# Patient Record
Sex: FEMALE | Race: WHITE | NOT HISPANIC OR LATINO | Employment: UNEMPLOYED | URBAN - METROPOLITAN AREA
[De-identification: names, ages, dates, MRNs, and addresses within clinical notes are randomized per-mention and may not be internally consistent; named-entity substitution may affect disease eponyms.]

---
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
## 2022-06-25 ENCOUNTER — HOSPITAL ENCOUNTER (EMERGENCY)
Facility: HOSPITAL | Age: 22
Discharge: HOME | End: 2022-06-26
Attending: EMERGENCY MEDICINE | Admitting: EMERGENCY MEDICINE

## 2022-06-25 DIAGNOSIS — N39.0 ACUTE UTI: Primary | ICD-10-CM

## 2022-06-25 LAB
B-HCG UR QL: NEGATIVE
BILIRUB UR QL STRIP: ABNORMAL
CLARITY UR: ABNORMAL
COLOR UR: YELLOW
GLUCOSE UR STRIP-MCNC: NEGATIVE MG/DL
HGB UR QL STRIP.AUTO: ABNORMAL
KETONES UR QL STRIP: ABNORMAL
LEUKOCYTE ESTERASE UR QL STRIP.AUTO: ABNORMAL
NITRITE UR QL STRIP: POSITIVE
PH UR STRIP.AUTO: 6 [PH] (ref 5–8)
PROT UR QL STRIP: ABNORMAL
SP GR UR STRIP: 1.03 (ref 1–1.03)
UROBILINOGEN UR QL STRIP: ABNORMAL

## 2022-06-25 PROCEDURE — 87186 SC STD MICRODIL/AGAR DIL: CPT | Performed by: EMERGENCY MEDICINE

## 2022-06-25 PROCEDURE — 87086 URINE CULTURE/COLONY COUNT: CPT | Performed by: EMERGENCY MEDICINE

## 2022-06-25 PROCEDURE — 81001 URINALYSIS AUTO W/SCOPE: CPT | Performed by: EMERGENCY MEDICINE

## 2022-06-25 PROCEDURE — 87088 URINE BACTERIA CULTURE: CPT | Performed by: EMERGENCY MEDICINE

## 2022-06-25 PROCEDURE — 81025 URINE PREGNANCY TEST: CPT | Performed by: EMERGENCY MEDICINE

## 2022-06-25 PROCEDURE — 99283 EMERGENCY DEPT VISIT LOW MDM: CPT

## 2022-06-26 VITALS
OXYGEN SATURATION: 98 % | WEIGHT: 163.14 LBS | HEIGHT: 62 IN | BODY MASS INDEX: 30.02 KG/M2 | HEART RATE: 89 BPM | TEMPERATURE: 98.2 F | RESPIRATION RATE: 17 BRPM | SYSTOLIC BLOOD PRESSURE: 104 MMHG | DIASTOLIC BLOOD PRESSURE: 69 MMHG

## 2022-06-26 LAB
BACTERIA UR QL AUTO: ABNORMAL /HPF
HYALINE CASTS UR QL AUTO: ABNORMAL /LPF
MUCOUS THREADS URNS QL MICRO: ABNORMAL /HPF
RBC # UR STRIP: ABNORMAL /HPF
REF LAB TEST METHOD: ABNORMAL
SQUAMOUS #/AREA URNS HPF: ABNORMAL /HPF
WBC # UR STRIP: ABNORMAL /HPF

## 2022-06-26 RX ORDER — PHENAZOPYRIDINE HYDROCHLORIDE 200 MG/1
200 TABLET, FILM COATED ORAL 3 TIMES DAILY PRN
Qty: 6 TABLET | Refills: 0 | Status: SHIPPED | OUTPATIENT
Start: 2022-06-26

## 2022-06-26 RX ORDER — SULFAMETHOXAZOLE AND TRIMETHOPRIM 800; 160 MG/1; MG/1
1 TABLET ORAL ONCE
Status: COMPLETED | OUTPATIENT
Start: 2022-06-26 | End: 2022-06-26

## 2022-06-26 RX ORDER — PHENAZOPYRIDINE HYDROCHLORIDE 200 MG/1
200 TABLET, FILM COATED ORAL ONCE
Status: COMPLETED | OUTPATIENT
Start: 2022-06-26 | End: 2022-06-26

## 2022-06-26 RX ORDER — SULFAMETHOXAZOLE AND TRIMETHOPRIM 800; 160 MG/1; MG/1
1 TABLET ORAL 2 TIMES DAILY
Qty: 10 TABLET | Refills: 0 | Status: SHIPPED | OUTPATIENT
Start: 2022-06-26

## 2022-06-26 RX ORDER — IBUPROFEN 600 MG/1
600 TABLET ORAL ONCE
Status: COMPLETED | OUTPATIENT
Start: 2022-06-26 | End: 2022-06-26

## 2022-06-26 RX ADMIN — IBUPROFEN 600 MG: 600 TABLET, FILM COATED ORAL at 01:14

## 2022-06-26 RX ADMIN — PHENAZOPYRIDINE HYDROCHLORIDE 200 MG: 200 TABLET ORAL at 01:13

## 2022-06-26 RX ADMIN — SULFAMETHOXAZOLE AND TRIMETHOPRIM 1 TABLET: 800; 160 TABLET ORAL at 01:14

## 2022-06-26 NOTE — ED PROVIDER NOTES
Subjective   22-year-old female with hematuria and some dysuria for the past 24 hours.  Patient denies any vaginal symptoms, normal last menstrual period 2 weeks ago.  Patient denies any fever or flank pain.  Patient has some mild lower abdominal pain associate with this without any vomiting or diarrhea or appetite change.          Review of Systems   Constitutional: Negative.    Gastrointestinal: Positive for abdominal pain.   Genitourinary: Positive for hematuria.   Musculoskeletal: Negative.    Skin: Negative.    Neurological: Negative.    Psychiatric/Behavioral: Negative.        Past Medical History:   Diagnosis Date   • Acid reflux    • Allergic    • Anxiety    • Bipolar disorder (CMS/HCC)    • Depression    • Sinusitis    • Strep throat        Allergies   Allergen Reactions   • Peanut (Diagnostic) Hives, Shortness Of Breath and Swelling       Past Surgical History:   Procedure Laterality Date   • ADENOIDECTOMY     • TONSILLECTOMY     • WISDOM TOOTH EXTRACTION         Family History   Adopted: Yes   Problem Relation Age of Onset   • ADD / ADHD Mother    • Depression Mother    • Anxiety disorder Mother    • Bipolar disorder Mother    • Arthritis Mother    • Anemia Mother    • ADD / ADHD Father    • Depression Father    • Anxiety disorder Father    • Anemia Sister    • Heart murmur Sister    • Obesity Paternal Uncle        Social History     Socioeconomic History   • Marital status: Single   Tobacco Use   • Smoking status: Passive Smoke Exposure - Never Smoker   • Smokeless tobacco: Former User   • Tobacco comment: Parents smoked in home for 13 years; pt vapes   Substance and Sexual Activity   • Alcohol use: No   • Drug use: Defer   • Sexual activity: Defer           Objective   Physical Exam  Constitutional:       Appearance: Normal appearance.   HENT:      Head: Normocephalic and atraumatic.      Mouth/Throat:      Mouth: Mucous membranes are moist.      Pharynx: Oropharynx is clear.   Eyes:       Conjunctiva/sclera: Conjunctivae normal.      Pupils: Pupils are equal, round, and reactive to light.   Cardiovascular:      Rate and Rhythm: Normal rate and regular rhythm.      Heart sounds: Normal heart sounds.   Pulmonary:      Effort: Pulmonary effort is normal.      Breath sounds: Normal breath sounds.   Abdominal:      General: Bowel sounds are normal. There is no distension.      Palpations: Abdomen is soft.      Comments: Mild lower abdominal tenderness, no McBurney's point tenderness   Musculoskeletal:         General: No swelling or tenderness. Normal range of motion.   Skin:     General: Skin is warm and dry.      Capillary Refill: Capillary refill takes less than 2 seconds.   Neurological:      General: No focal deficit present.      Mental Status: She is alert and oriented to person, place, and time.   Psychiatric:         Mood and Affect: Mood normal.         Behavior: Behavior normal.         Procedures           ED Course                                           MDM  Number of Diagnoses or Management Options  Acute UTI  Diagnosis management comments: Results for orders placed or performed during the hospital encounter of 06/25/22  -Urinalysis With Culture If Indicated - Urine, Clean Catch:   Specimen: Urine, Clean Catch       Result                      Value             Ref Range           Color, UA                   Yellow            Yellow, Straw       Appearance, UA              Turbid (A)        Clear               pH, UA                      6.0               5.0 - 8.0           Specific [place], UA        1.029             1.005 - 1.030       Glucose, UA                 Negative          Negative            Ketones, UA                                   Negative        15 mg/dL (1+) (A)       Bilirubin, UA                                 Negative        Small (1+) (A)       Blood, UA                                     Negative        Large (3+) (A)       Protein, UA                                    Negative        >=300 mg/dL (3+) (A)       Leuk Esterase, UA                             Negative        Large (3+) (A)       Nitrite, UA                 Positive (A)      Negative            Urobilinogen, UA            1.0 E.U./dL       0.2 - 1.0 E.*  -Pregnancy, Urine - Urine, Clean Catch:   Specimen: Urine, Clean Catch       Result                      Value             Ref Range           HCG, Urine QL               Negative          Negative       -Urinalysis, Microscopic Only - Urine, Clean Catch:   Specimen: Urine, Clean Catch       Result                      Value             Ref Range           RBC, UA                     21-30 (A)         None Seen /H*       WBC, UA                                       None Seen /H*   Too Numerous to Count (A)       Bacteria, UA                3+ (A)            None Seen /H*       Squamous Epithelial Ce*     0-2               None Seen, 0*       Hyaline Casts, UA           0-2               None Seen /L*       Mucus, UA                   Small/1+ (A)      None Seen, T*       Methodology                                                   Manual Light Microscopy    Patient appears well, return precautions reviewed, no recent antibiotic usage.       Amount and/or Complexity of Data Reviewed  Clinical lab tests: ordered and reviewed        Final diagnoses:   Acute UTI       ED Disposition  ED Disposition     ED Disposition   Discharge    Condition   Stable    Comment   --             Nupur Willis, APRN  [address] [place] [address]  [phone]               Medication List      New Prescriptions    phenazopyridine 200 MG tablet  Commonly known as: PYRIDIUM  Take 1 tablet by mouth 3 (Three) Times a Day As Needed for Bladder Spasms.     sulfamethoxazole-trimethoprim 800-160 MG per tablet  Commonly known as: BACTRIM DS,SEPTRA DS  Take 1 tablet by mouth 2 (Two) Times a Day.           Where to Get Your Medications      These medications were sent  to [id number] - [place], [place] - [address] [place] [facility] [place] [address] [address] [place] - [phone]  - [phone] FX  [address] [place] [address]    Phone: [phone]   · phenazopyridine 200 MG tablet  · sulfamethoxazole-trimethoprim 800-160 MG per tablet          Saeid Wooten MD  06/26/22 0041

## 2022-06-27 LAB — BACTERIA SPEC AEROBE CULT: ABNORMAL

## 2022-06-27 NOTE — PHARMACY RECOMMENDATION
Patient urine culture resulted with E. coli. Susceptible to Sulfonamides. Patient was given Rx for sulfamethoxazole/ trimethoprim. Therapy is appropriate coverage. No further follow-up required..    Microbiology Results (last 10 days)       Procedure Component Value - Date/Time    Urine Culture - Urine, Urine, Clean Catch [[id number]]  (Abnormal)  (Susceptibility) Collected: 06/25/22 3786    Lab Status: Final result Specimen: Urine, Clean Catch Updated: 06/27/22 1042     Urine Culture >100,000 CFU/mL Escherichia coli    Narrative:      Colonization of the urinary tract without infection is common. Treatment is discouraged unless the patient is symptomatic, pregnant, or undergoing an invasive urologic procedure.    Susceptibility        Escherichia coli      RONNY      Ampicillin Susceptible      Ampicillin + Sulbactam Susceptible      Cefazolin Susceptible      Cefepime Susceptible      Ceftazidime Susceptible      Ceftriaxone Susceptible      Gentamicin Susceptible      Levofloxacin Susceptible      Nitrofurantoin Susceptible      Piperacillin + Tazobactam Susceptible      Trimethoprim + Sulfamethoxazole Susceptible                                   Amy Soliman RPH  6/27/2022 11:27 EDT

## 2022-09-19 ENCOUNTER — HOSPITAL ENCOUNTER (EMERGENCY)
Facility: HOSPITAL | Age: 22
Discharge: LEFT BEFORE DISCHARGE | End: 2022-09-19
Attending: EMERGENCY MEDICINE | Admitting: EMERGENCY MEDICINE

## 2022-09-19 VITALS
SYSTOLIC BLOOD PRESSURE: 105 MMHG | DIASTOLIC BLOOD PRESSURE: 69 MMHG | OXYGEN SATURATION: 96 % | HEIGHT: 62 IN | WEIGHT: 132.28 LBS | RESPIRATION RATE: 16 BRPM | BODY MASS INDEX: 24.34 KG/M2 | HEART RATE: 70 BPM | TEMPERATURE: 98.2 F

## 2022-09-19 PROCEDURE — 99211 OFF/OP EST MAY X REQ PHY/QHP: CPT | Performed by: EMERGENCY MEDICINE

## 2025-02-07 ENCOUNTER — HOSPITAL ENCOUNTER (EMERGENCY)
Facility: HOSPITAL | Age: 25
Discharge: HOME | End: 2025-02-07
Attending: EMERGENCY MEDICINE
Payer: MEDICAID

## 2025-02-07 ENCOUNTER — APPOINTMENT (OUTPATIENT)
Dept: ULTRASOUND IMAGING | Facility: HOSPITAL | Age: 25
End: 2025-02-07
Payer: MEDICAID

## 2025-02-07 VITALS
RESPIRATION RATE: 14 BRPM | DIASTOLIC BLOOD PRESSURE: 58 MMHG | TEMPERATURE: 98.1 F | HEIGHT: 62 IN | BODY MASS INDEX: 24.79 KG/M2 | HEART RATE: 78 BPM | OXYGEN SATURATION: 100 % | WEIGHT: 134.7 LBS | SYSTOLIC BLOOD PRESSURE: 98 MMHG

## 2025-02-07 DIAGNOSIS — Z3A.17 17 WEEKS GESTATION OF PREGNANCY: ICD-10-CM

## 2025-02-07 DIAGNOSIS — E86.0 DEHYDRATION: ICD-10-CM

## 2025-02-07 DIAGNOSIS — N39.0 URINARY TRACT INFECTION WITHOUT HEMATURIA, SITE UNSPECIFIED: ICD-10-CM

## 2025-02-07 DIAGNOSIS — R11.2 NAUSEA AND VOMITING, UNSPECIFIED VOMITING TYPE: Primary | ICD-10-CM

## 2025-02-07 LAB
ALBUMIN SERPL-MCNC: 3.5 G/DL (ref 3.5–5.2)
ALBUMIN/GLOB SERPL: 1.4 G/DL
ALP SERPL-CCNC: 44 U/L (ref 39–117)
ALT SERPL W P-5'-P-CCNC: 7 U/L (ref 1–33)
ANION GAP SERPL CALCULATED.3IONS-SCNC: 7.6 MMOL/L (ref 5–15)
AST SERPL-CCNC: 13 U/L (ref 1–32)
ATMOSPHERIC PRESS: ABNORMAL MM[HG]
BACTERIA UR QL AUTO: ABNORMAL /HPF
BASE EXCESS BLDV CALC-SCNC: -1.1 MMOL/L (ref -2–2)
BASOPHILS # BLD AUTO: 0.02 10*3/MM3 (ref 0–0.2)
BASOPHILS NFR BLD AUTO: 0.2 % (ref 0–1.5)
BDY SITE: ABNORMAL
BILIRUB SERPL-MCNC: 0.2 MG/DL (ref 0–1.2)
BILIRUB UR QL STRIP: NEGATIVE
BUN SERPL-MCNC: 5 MG/DL (ref 6–20)
BUN/CREAT SERPL: 8.9 (ref 7–25)
CALCIUM SPEC-SCNC: 8.8 MG/DL (ref 8.6–10.5)
CHLORIDE SERPL-SCNC: 105 MMOL/L (ref 98–107)
CLARITY UR: ABNORMAL
CO2 BLDA-SCNC: 26.8 MMOL/L (ref 22–29)
CO2 SERPL-SCNC: 23.4 MMOL/L (ref 22–29)
COLOR UR: YELLOW
CREAT SERPL-MCNC: 0.56 MG/DL (ref 0.57–1)
D-LACTATE SERPL-SCNC: 1 MMOL/L (ref 0.3–2)
DEPRECATED RDW RBC AUTO: 48.9 FL (ref 37–54)
EGFRCR SERPLBLD CKD-EPI 2021: 132.5 ML/MIN/1.73
EOSINOPHIL # BLD AUTO: 0.14 10*3/MM3 (ref 0–0.4)
EOSINOPHIL NFR BLD AUTO: 1.4 % (ref 0.3–6.2)
ERYTHROCYTE [DISTWIDTH] IN BLOOD BY AUTOMATED COUNT: 14.2 % (ref 12.3–15.4)
GLOBULIN UR ELPH-MCNC: 2.5 GM/DL
GLUCOSE SERPL-MCNC: 84 MG/DL (ref 65–99)
GLUCOSE UR STRIP-MCNC: NEGATIVE MG/DL
HCG INTACT+B SERPL-ACNC: NORMAL MIU/ML
HCO3 BLDV-SCNC: 25.3 MMOL/L (ref 22–26)
HCT VFR BLD AUTO: 31 % (ref 34–46.6)
HGB BLD-MCNC: 10.2 G/DL (ref 12–15.9)
HGB UR QL STRIP.AUTO: ABNORMAL
HYALINE CASTS UR QL AUTO: ABNORMAL /LPF
IMM GRANULOCYTES # BLD AUTO: 0.04 10*3/MM3 (ref 0–0.05)
IMM GRANULOCYTES NFR BLD AUTO: 0.4 % (ref 0–0.5)
INHALED O2 CONCENTRATION: 21 %
KETONES UR QL STRIP: ABNORMAL
LEUKOCYTE ESTERASE UR QL STRIP.AUTO: ABNORMAL
LYMPHOCYTES # BLD AUTO: 1.71 10*3/MM3 (ref 0.7–3.1)
LYMPHOCYTES NFR BLD AUTO: 17.1 % (ref 19.6–45.3)
MCH RBC QN AUTO: 30.8 PG (ref 26.6–33)
MCHC RBC AUTO-ENTMCNC: 32.9 G/DL (ref 31.5–35.7)
MCV RBC AUTO: 93.7 FL (ref 79–97)
MODALITY: ABNORMAL
MONOCYTES # BLD AUTO: 0.62 10*3/MM3 (ref 0.1–0.9)
MONOCYTES NFR BLD AUTO: 6.2 % (ref 5–12)
NEUTROPHILS NFR BLD AUTO: 7.48 10*3/MM3 (ref 1.7–7)
NEUTROPHILS NFR BLD AUTO: 74.7 % (ref 42.7–76)
NITRITE UR QL STRIP: NEGATIVE
NRBC BLD AUTO-RTO: 0 /100 WBC (ref 0–0.2)
PCO2 BLDV: 48.8 MM HG (ref 42–51)
PH BLDV: 7.32 PH UNITS (ref 7.32–7.43)
PH UR STRIP.AUTO: 6 [PH] (ref 5–8)
PLATELET # BLD AUTO: 206 10*3/MM3 (ref 140–450)
PMV BLD AUTO: 10.4 FL (ref 6–12)
PO2 BLDV: 19.8 MM HG (ref 40–42)
POTASSIUM SERPL-SCNC: 3.7 MMOL/L (ref 3.5–5.2)
PROT SERPL-MCNC: 6 G/DL (ref 6–8.5)
PROT UR QL STRIP: ABNORMAL
RBC # BLD AUTO: 3.31 10*6/MM3 (ref 3.77–5.28)
RBC # UR STRIP: ABNORMAL /HPF
REF LAB TEST METHOD: ABNORMAL
SAO2 % BLDCOV: 27.2 % (ref 45–75)
SODIUM SERPL-SCNC: 136 MMOL/L (ref 136–145)
SP GR UR STRIP: 1.02 (ref 1–1.03)
SQUAMOUS #/AREA URNS HPF: ABNORMAL /HPF
UROBILINOGEN UR QL STRIP: ABNORMAL
WBC # UR STRIP: ABNORMAL /HPF
WBC NRBC COR # BLD AUTO: 10.01 10*3/MM3 (ref 3.4–10.8)

## 2025-02-07 PROCEDURE — 25810000003 SODIUM CHLORIDE 0.9 % SOLUTION: Performed by: EMERGENCY MEDICINE

## 2025-02-07 PROCEDURE — 96365 THER/PROPH/DIAG IV INF INIT: CPT

## 2025-02-07 PROCEDURE — 83605 ASSAY OF LACTIC ACID: CPT | Performed by: EMERGENCY MEDICINE

## 2025-02-07 PROCEDURE — 84702 CHORIONIC GONADOTROPIN TEST: CPT | Performed by: EMERGENCY MEDICINE

## 2025-02-07 PROCEDURE — 82803 BLOOD GASES ANY COMBINATION: CPT | Performed by: EMERGENCY MEDICINE

## 2025-02-07 PROCEDURE — 87186 SC STD MICRODIL/AGAR DIL: CPT | Performed by: EMERGENCY MEDICINE

## 2025-02-07 PROCEDURE — 99284 EMERGENCY DEPT VISIT MOD MDM: CPT

## 2025-02-07 PROCEDURE — 25010000002 THIAMINE HCL 200 MG/2ML SOLUTION: Performed by: EMERGENCY MEDICINE

## 2025-02-07 PROCEDURE — 81001 URINALYSIS AUTO W/SCOPE: CPT | Performed by: EMERGENCY MEDICINE

## 2025-02-07 PROCEDURE — 87086 URINE CULTURE/COLONY COUNT: CPT | Performed by: EMERGENCY MEDICINE

## 2025-02-07 PROCEDURE — 80053 COMPREHEN METABOLIC PANEL: CPT | Performed by: EMERGENCY MEDICINE

## 2025-02-07 PROCEDURE — 87077 CULTURE AEROBIC IDENTIFY: CPT | Performed by: EMERGENCY MEDICINE

## 2025-02-07 PROCEDURE — 25810000003 LACTATED RINGERS SOLUTION: Performed by: EMERGENCY MEDICINE

## 2025-02-07 PROCEDURE — 76815 OB US LIMITED FETUS(S): CPT

## 2025-02-07 PROCEDURE — 87040 BLOOD CULTURE FOR BACTERIA: CPT | Performed by: EMERGENCY MEDICINE

## 2025-02-07 PROCEDURE — 36415 COLL VENOUS BLD VENIPUNCTURE: CPT

## 2025-02-07 PROCEDURE — 96375 TX/PRO/DX INJ NEW DRUG ADDON: CPT

## 2025-02-07 PROCEDURE — 25010000002 CEFTRIAXONE PER 250 MG: Performed by: EMERGENCY MEDICINE

## 2025-02-07 PROCEDURE — 85025 COMPLETE CBC W/AUTO DIFF WBC: CPT | Performed by: EMERGENCY MEDICINE

## 2025-02-07 RX ORDER — SODIUM CHLORIDE 0.9 % (FLUSH) 0.9 %
10 SYRINGE (ML) INJECTION AS NEEDED
Status: DISCONTINUED | OUTPATIENT
Start: 2025-02-07 | End: 2025-02-07 | Stop reason: HOSPADM

## 2025-02-07 RX ORDER — THIAMINE HYDROCHLORIDE 100 MG/ML
200 INJECTION, SOLUTION INTRAMUSCULAR; INTRAVENOUS ONCE
Status: COMPLETED | OUTPATIENT
Start: 2025-02-07 | End: 2025-02-07

## 2025-02-07 RX ADMIN — THIAMINE HYDROCHLORIDE 200 MG: 100 INJECTION, SOLUTION INTRAMUSCULAR; INTRAVENOUS at 16:38

## 2025-02-07 RX ADMIN — SODIUM CHLORIDE 1000 ML: 9 INJECTION, SOLUTION INTRAVENOUS at 16:39

## 2025-02-07 RX ADMIN — CEFTRIAXONE 2000 MG: 2 INJECTION, POWDER, FOR SOLUTION INTRAMUSCULAR; INTRAVENOUS at 18:28

## 2025-02-07 RX ADMIN — SODIUM CHLORIDE, POTASSIUM CHLORIDE, SODIUM LACTATE AND CALCIUM CHLORIDE 1000 ML: 600; 310; 30; 20 INJECTION, SOLUTION INTRAVENOUS at 18:22

## 2025-02-07 NOTE — ED NOTES
Pt rpeorted her bladder was full and ready for ultrasound. Attempted to call and left message for ultrasound 5 times.

## 2025-02-07 NOTE — ED PROVIDER NOTES
Subjective   History of Present Illness  24-year-old female states she was seen at urgent care center today for nausea and lightheadedness.  States she has had some vomiting on and off for the last couple of months.  States she had a positive home pregnancy test last month and her last menstruation was October.  She denies abdominal pain or vaginal bleeding or discharge.  She states she did have some vomiting today she reports no diarrhea or constipation.  States she has 1 previous pregnancy that miscarried after 1 week  She reports no urinary symptoms.  Review of Systems    Past Medical History:   Diagnosis Date    Acid reflux     Allergic     Anxiety     Bipolar disorder     Depression     Sinusitis     Strep throat        Allergies   Allergen Reactions    Peanut (Diagnostic) Hives, Shortness Of Breath and Swelling       Past Surgical History:   Procedure Laterality Date    ADENOIDECTOMY      TONSILLECTOMY      WISDOM TOOTH EXTRACTION         Family History   Adopted: Yes   Problem Relation Age of Onset    ADD / ADHD Mother     Depression Mother     Anxiety disorder Mother     Bipolar disorder Mother     Arthritis Mother     Anemia Mother     ADD / ADHD Father     Depression Father     Anxiety disorder Father     Anemia Sister     Heart murmur Sister     Obesity Paternal Uncle        Social History     Socioeconomic History    Marital status: Single   Tobacco Use    Smoking status: Former     Types: Electronic Cigarette     Start date: 8/13/2019     Passive exposure: Yes    Smokeless tobacco: Former    Tobacco comments:     Parents smoked in home for 13 years; pt vapes   Vaping Use    Vaping status: Every Day   Substance and Sexual Activity    Alcohol use: No    Drug use: Defer    Sexual activity: Defer     Prior to Admission medications    Medication Sig Start Date End Date Taking? Authorizing Provider   azithromycin (Zithromax Z-Ruddy) 250 MG tablet Take 2 tablets the first day, then 1 tablet daily for 4 days.  "10/14/20   Holly Velázquez PA-C   ondansetron ODT (Zofran ODT) 4 MG disintegrating tablet Place 1 tablet under the tongue Every 8 (Eight) Hours As Needed for Nausea. 1/27/21   Holly Graham PA   oxymetazoline (AFRIN) 0.05 % nasal spray 2 sprays into the nostril(s) as directed by provider 2 (Two) Times a Day. 10/14/20   Holly Velázquez PA-C   omeprazole (priLOSEC) 20 MG capsule PRN 6/4/19 2/7/25  Isabelle Orellana MD   phenazopyridine (PYRIDIUM) 200 MG tablet Take 1 tablet by mouth 3 (Three) Times a Day As Needed for Bladder Spasms. 6/26/22 2/7/25  Saeid Wooten MD   sulfamethoxazole-trimethoprim (BACTRIM DS,SEPTRA DS) 800-160 MG per tablet Take 1 tablet by mouth 2 (Two) Times a Day. 6/26/22 2/7/25  Saeid Wooten MD   TRI-LO-ESTARYLLA 0.18/0.215/0.25 MG-25 MCG per tablet Take 1 tablet by mouth Daily. 8/22/19 2/7/25  ProviderIsabelle MD     BP 97/59   Pulse 82   Temp 98.1 °F (36.7 °C)   Resp 14   Ht 157.5 cm (62\")   Wt 61.1 kg (134 lb 11.2 oz)   LMP  (LMP Unknown)   SpO2 93%   Breastfeeding Unknown   BMI 24.64 kg/m²         Objective   Physical Exam  General: Well-developed female awake and alert, no acute distress  Eyes: Pupils round and reactive, sclera nonicteric  HEENT: Mucous membranes very dry, no mucosal swelling  Neck: Supple, no nuchal rigidity, no JVD  Respirations: Respirations nonlabored, equal breath sounds bilaterally, clear lungs  Heart regular rate and rhythm, no murmurs rubs or gallops,   Abdomen soft nontender nondistended, no hepatosplenomegaly, no hernia, no mass, normal bowel sounds, no CVA tenderness  Extremities no clubbing cyanosis or edema, calves are symmetric and nontender  Neuro cranial nerves grossly intact, no focal limb deficits  Psych oriented, pleasant affect  Skin no rash, brisk cap refill  Procedures           ED Course    Results for orders placed or performed during the hospital encounter of 02/07/25   Comprehensive Metabolic Panel    Collection Time: " 02/07/25  4:14 PM    Specimen: Blood   Result Value Ref Range    Glucose 84 65 - 99 mg/dL    BUN 5 (L) 6 - 20 mg/dL    Creatinine 0.56 (L) 0.57 - 1.00 mg/dL    Sodium 136 136 - 145 mmol/L    Potassium 3.7 3.5 - 5.2 mmol/L    Chloride 105 98 - 107 mmol/L    CO2 23.4 22.0 - 29.0 mmol/L    Calcium 8.8 8.6 - 10.5 mg/dL    Total Protein 6.0 6.0 - 8.5 g/dL    Albumin 3.5 3.5 - 5.2 g/dL    ALT (SGPT) 7 1 - 33 U/L    AST (SGOT) 13 1 - 32 U/L    Alkaline Phosphatase 44 39 - 117 U/L    Total Bilirubin 0.2 0.0 - 1.2 mg/dL    Globulin 2.5 gm/dL    A/G Ratio 1.4 g/dL    BUN/Creatinine Ratio 8.9 7.0 - 25.0    Anion Gap 7.6 5.0 - 15.0 mmol/L    eGFR 132.5 >60.0 mL/min/1.73   hCG, Quantitative, Pregnancy    Collection Time: 02/07/25  4:14 PM    Specimen: Blood   Result Value Ref Range    HCG Quantitative 21,286.00 mIU/mL   Urinalysis With Microscopic If Indicated (No Culture) - Urine, Clean Catch    Collection Time: 02/07/25  4:14 PM    Specimen: Urine, Clean Catch   Result Value Ref Range    Color, UA Yellow Yellow, Straw    Appearance, UA Cloudy (A) Clear    pH, UA 6.0 5.0 - 8.0    Specific Gravity, UA 1.023 1.005 - 1.030    Glucose, UA Negative Negative    Ketones, UA Trace (A) Negative    Bilirubin, UA Negative Negative    Blood, UA Moderate (2+) (A) Negative    Protein,  mg/dL (2+) (A) Negative    Leuk Esterase, UA Moderate (2+) (A) Negative    Nitrite, UA Negative Negative    Urobilinogen, UA 1.0 E.U./dL 0.2 - 1.0 E.U./dL   CBC Auto Differential    Collection Time: 02/07/25  4:14 PM    Specimen: Blood   Result Value Ref Range    WBC 10.01 3.40 - 10.80 10*3/mm3    RBC 3.31 (L) 3.77 - 5.28 10*6/mm3    Hemoglobin 10.2 (L) 12.0 - 15.9 g/dL    Hematocrit 31.0 (L) 34.0 - 46.6 %    MCV 93.7 79.0 - 97.0 fL    MCH 30.8 26.6 - 33.0 pg    MCHC 32.9 31.5 - 35.7 g/dL    RDW 14.2 12.3 - 15.4 %    RDW-SD 48.9 37.0 - 54.0 fl    MPV 10.4 6.0 - 12.0 fL    Platelets 206 140 - 450 10*3/mm3    Neutrophil % 74.7 42.7 - 76.0 %    Lymphocyte %  17.1 (L) 19.6 - 45.3 %    Monocyte % 6.2 5.0 - 12.0 %    Eosinophil % 1.4 0.3 - 6.2 %    Basophil % 0.2 0.0 - 1.5 %    Immature Grans % 0.4 0.0 - 0.5 %    Neutrophils, Absolute 7.48 (H) 1.70 - 7.00 10*3/mm3    Lymphocytes, Absolute 1.71 0.70 - 3.10 10*3/mm3    Monocytes, Absolute 0.62 0.10 - 0.90 10*3/mm3    Eosinophils, Absolute 0.14 0.00 - 0.40 10*3/mm3    Basophils, Absolute 0.02 0.00 - 0.20 10*3/mm3    Immature Grans, Absolute 0.04 0.00 - 0.05 10*3/mm3    nRBC 0.0 0.0 - 0.2 /100 WBC   Urinalysis, Microscopic Only - Urine, Clean Catch    Collection Time: 02/07/25  4:14 PM    Specimen: Urine, Clean Catch   Result Value Ref Range    RBC, UA 11-20 (A) None Seen, 0-2 /HPF    WBC, UA Too Numerous to Count (A) None Seen, 0-2 /HPF    Bacteria, UA 2+ (A) None Seen /HPF    Squamous Epithelial Cells, UA 3-6 (A) None Seen, 0-2 /HPF    Hyaline Casts, UA 7-12 None Seen /LPF    Methodology Manual Light Microscopy    Blood Gas, Venous -    Collection Time: 02/07/25  4:29 PM    Specimen: Venous Blood   Result Value Ref Range    Site CentralLine     pH, Venous 7.322 7.320 - 7.430 pH Units    pCO2, Venous 48.8 42.0 - 51.0 mm Hg    pO2, Venous 19.8 (C) 40.0 - 42.0 mm Hg    HCO3, Venous 25.3 22.0 - 26.0 mmol/L    Base Excess, Venous -1.1 -2.0 - 2.0 mmol/L    O2 Saturation, Venous 27.2 (L) 45.0 - 75.0 %    CO2 Content 26.8 22 - 29 mmol/L    Barometric Pressure for Blood Gas      Modality Room Air     FIO2 21 %   POC Lactate    Collection Time: 02/07/25  6:29 PM    Specimen: Blood   Result Value Ref Range    Lactate 1.0 0.3 - 2.0 mmol/L     US Ob Limited 1 + Fetuses    Result Date: 2/7/2025  Small fetal colloid cysts of unknown clinical significance. Otherwise normal appearance of the uterus. Fetal heart rate is normal. Electronically Signed: Krishna Han MD  2/7/2025 7:35 PM EST  Workstation ID: [id number]     ED Course as of 02/07/25 2017 Fri Feb 07, 2025 1949 Patient states she feels much better.  She has been tolerating clear  liquids and had no vomiting since arrival.  She has a benign abdominal examination with no tenderness.  Patient is eager to go home. [SH]      ED Course User Index  [SH] Gunnar Hester MD                                                       Medical Decision Making  Differential diagnosis including dehydration, sepsis, ectopic pregnancy, hyperemesis gravidarum    Patient has a benign abdominal examination with no signs of peritonitis or acute abdomen.  She is having no pain or tenderness or bleeding.  She has had no prenatal care.  Ultrasound today shows living intrauterine pregnancy around 17 weeks.  Patient was clinically very dehydrated.  She was ordered IV fluids and was tolerating clear liquids and feeling much better on reexamination.  Blood pressure was soft but improved.  She was up and ambulating back and forth to the bathroom without difficulty and was very eager to go home.  She received IV Rocephin for urinary tract infection.  She had no fever or leukocytosis.  She was not felt to be septic on today's evaluation.  I suspect her hypotension related to her pregnancy and dehydration.  She was prescribed Augmentin.  She was given warning signs for return and referred for OB follow-up.  I did offer her overnight admission for further hydration and evaluation.  She declined this and was eager to go home.    Problems Addressed:  17 weeks gestation of pregnancy: complicated acute illness or injury  Dehydration: complicated acute illness or injury  Nausea and vomiting, unspecified vomiting type: complicated acute illness or injury  Urinary tract infection without hematuria, site unspecified: complicated acute illness or injury    Amount and/or Complexity of Data Reviewed  Labs: ordered. Decision-making details documented in ED Course.     Details: Venous blood gas shows normal pH, lactate normal, urinalysis shows 2+ bacteria, urine culture pending, CBC shows no leukocytosis, mild anemia, comprehensive  metabolic panel essentially normal  Radiology: ordered.    Risk  Prescription drug management.        Final diagnoses:   Nausea and vomiting, unspecified vomiting type   Dehydration   17 weeks gestation of pregnancy   Urinary tract infection without hematuria, site unspecified       ED Disposition  ED Disposition       ED Disposition   Discharge    Condition   Stable    Comment   --               [facility]  [address] [facility] [facility] [address]  [facility] [address] [phone]  In 2 days           Medication List        New Prescriptions      amoxicillin-clavulanate 875-125 MG per tablet  Commonly known as: AUGMENTIN  Take 1 tablet by mouth 2 (Two) Times a Day.               Where to Get Your Medications        These medications were sent to [id number] - [place], [place] - [address] AT SEC OF [address] - [phone]  - [phone] FX  [address] [place] [address]      Phone: [phone]   amoxicillin-clavulanate 875-125 MG per tablet            Gunnar Hester MD  02/07/25 2017

## 2025-02-08 ENCOUNTER — HOSPITAL ENCOUNTER (EMERGENCY)
Facility: HOSPITAL | Age: 25
Discharge: HOME | End: 2025-02-08
Payer: MEDICAID

## 2025-02-08 VITALS
DIASTOLIC BLOOD PRESSURE: 58 MMHG | TEMPERATURE: 98.3 F | OXYGEN SATURATION: 100 % | RESPIRATION RATE: 18 BRPM | HEIGHT: 62 IN | SYSTOLIC BLOOD PRESSURE: 101 MMHG | HEART RATE: 76 BPM | BODY MASS INDEX: 25.96 KG/M2 | WEIGHT: 141.09 LBS

## 2025-02-08 DIAGNOSIS — N64.4 BREAST PAIN DURING PREGNANCY: Primary | ICD-10-CM

## 2025-02-08 DIAGNOSIS — O92.29 BREAST PAIN DURING PREGNANCY: Primary | ICD-10-CM

## 2025-02-08 PROCEDURE — 99282 EMERGENCY DEPT VISIT SF MDM: CPT

## 2025-02-08 NOTE — DISCHARGE INSTRUCTIONS
Rest, drink plenty of fluids, advance diet as tolerated.  Augmentin for urinary tract infection.  Call for OB follow-up.  Return for increased vomiting, fever, increased pain, bleeding or any other concern.  Vitamin B6 and doxylamine as needed for nausea.

## 2025-02-09 NOTE — ED PROVIDER NOTES
"Subjective   History of Present Illness  Patient is a 24-year-old female  currently 17 weeks pregnant presenting to the ED for bilateral breast swelling and tenderness.  Patient states she has been noticing extreme sensitivity in her breast bilaterally for a while now.  She states today she started noticing lumps bilaterally.  She states when she was pushing on them she noticed a's very small amount of clear nipple discharge bilaterally and felt like she had \"shards of glass in her breast\".  She denies any fever, chills, nipple bleeding, or history of breast problems or known family history of breast cancer, abdominal pain or cramping, or vaginal bleeding.        Review of Systems   Constitutional:  Negative for chills and fever.   Gastrointestinal:  Negative for abdominal pain.   Genitourinary:  Negative for vaginal bleeding.   Skin:         Bilateral breast pain       Past Medical History:   Diagnosis Date    Acid reflux     Allergic     Anxiety     Bipolar disorder     Depression     Sinusitis     Strep throat        Allergies   Allergen Reactions    Peanut (Diagnostic) Hives, Shortness Of Breath and Swelling       Past Surgical History:   Procedure Laterality Date    ADENOIDECTOMY      TONSILLECTOMY      WISDOM TOOTH EXTRACTION         Family History   Adopted: Yes   Problem Relation Age of Onset    ADD / ADHD Mother     Depression Mother     Anxiety disorder Mother     Bipolar disorder Mother     Arthritis Mother     Anemia Mother     ADD / ADHD Father     Depression Father     Anxiety disorder Father     Anemia Sister     Heart murmur Sister     Obesity Paternal Uncle        Social History     Socioeconomic History    Marital status: Single   Tobacco Use    Smoking status: Former     Types: Electronic Cigarette     Start date: 2019     Passive exposure: Yes    Smokeless tobacco: Former    Tobacco comments:     Parents smoked in home for 13 years; pt vapes   Vaping Use    Vaping status: Every Day " "  Substance and Sexual Activity    Alcohol use: No    Drug use: Defer    Sexual activity: Defer           Objective   Physical Exam  Constitutional:       Appearance: Normal appearance.   HENT:      Head: Normocephalic and atraumatic.      Mouth/Throat:      Mouth: Mucous membranes are moist.   Eyes:      Extraocular Movements: Extraocular movements intact.   Cardiovascular:      Rate and Rhythm: Normal rate and regular rhythm.      Pulses: Normal pulses.      Heart sounds: Normal heart sounds.   Pulmonary:      Effort: Pulmonary effort is normal.      Breath sounds: Normal breath sounds.   Chest:          Comments: Half a centimeter abrasion noted on left breast with no surrounding erythema, edema, ecchymosis, or discharge.  Tenderness to palpation bilaterally in breast, and no warmth to touch.  No lumps palpated or swollen lymph nodes in axilla bilaterally.  No visualized nipple discharge.  Abdominal:      General: Abdomen is flat. Bowel sounds are normal.      Palpations: Abdomen is soft.      Tenderness: There is no abdominal tenderness.   Musculoskeletal:         General: Normal range of motion.      Cervical back: Normal range of motion.   Skin:     General: Skin is warm and dry.      Capillary Refill: Capillary refill takes less than 2 seconds.   Neurological:      General: No focal deficit present.      Mental Status: She is alert and oriented to person, place, and time.   Psychiatric:         Mood and Affect: Mood normal.         Behavior: Behavior normal.         Procedures           ED Course      /58   Pulse 76   Temp 98.3 °F (36.8 °C) (Oral)   Resp 18   Ht 157.5 cm (62\")   Wt 64 kg (141 lb 1.5 oz)   LMP  (LMP Unknown)   SpO2 100%   BMI 25.81 kg/m²   Labs Reviewed - No data to display  Medications - No data to display  US Ob Limited 1 + Fetuses    Result Date: 2/7/2025  Small fetal colloid cysts of unknown clinical significance. Otherwise normal appearance of the uterus. Fetal heart rate is " normal. Electronically Signed: Krishna Han MD  2/7/2025 7:35 PM EST  Workstation ID: [id number]                                                    Medical Decision Making  Chart review: 2/8/25 Dr. Hester ED: Patient presented to ED for nausea nad lightheadedness. She was noted to be dehydrated and have UTI. She had no fever or leukocytosis. She was given IV fluids and rocephin and discharged with augemmntin and educated to follow up with OBGYN.     Patient presented to the ED for the above complaint.    Patient underwent the above exam and evaluation.    While in the ED patient was placed in a gown.  After physical exam, there were no signs of cellulitis or palpable masses concerning for abscesses, no palpable lymph nodes concerning for malignancy.  Discussed these findings with patient and family at bedside.  She was provided with a list of medications that are safe in pregnancy, educated patient to use Tylenol following package instructions as needed for pain, use warm compresses with skin barrier in between to prevent burning her skin, can use ice pads with skin barrier, decrease caffeine intake, continue antibiotic until complete, follow-up closely with OB/GYN, follow-up with PCP, and strict return precautions were discussed.  Patient voiced understanding, agreeable with dispo plan.  Patient ambulating independently without difficulty at time of discharge.    Labs and imaging were considered and deemed unnecessary, patient afebrile, nontoxic appearance, no acute distress, no signs of infection.    Appropriate PPE was worn during each patient encounter.    This document is intended for medical expert use only. Reading of this document by patients and/or patient's family without participating medical staff guidance may result in misinterpretation and unintended morbidity. Any interpretation of such data is the responsibility of the patient and/or family member responsible for the patient in concert with their  primary or specialist providers, not to be left for sources of online searches such as [employer] or similar queries. Relying on these approaches to knowledge may result in misinterpretation, misguided goals of care and even death should patients or family members try recommendations outside of the realm of professional medical care in a supervised inpatient environment.        Problems Addressed:  Breast pain during pregnancy: acute illness or injury        Final diagnoses:   Breast pain during pregnancy       ED Disposition  ED Disposition       ED Disposition   Discharge    Condition   Stable    Comment   --               Nupur Willis, APRN  [address] [id number] [phone]    Schedule an appointment as soon as possible for a visit       [facility]  [address] [address]  [phone]  Schedule an appointment as soon as possible for a visit            Medication List      No changes were made to your prescriptions during this visit.            Damari Vaz PA-C  02/08/25 2003

## 2025-02-09 NOTE — DISCHARGE INSTRUCTIONS
Use Tylenol following package instructions needed for pain.  Follow handout provided showing safe medications to take during pregnancy.  Increase fluid intake.  Continue antibiotic previously prescribed.  Can use warm compresses or ice packs, gentle massages, eliminate caffeine.    Follow-up closely with OB/GYN.    Follow-up with PCP.    Return to the ED for any new or worsening symptoms.

## 2025-02-09 NOTE — ED NOTES
Patient states she is homeless and living in her car with her boyfriend, states she is working with a  to find housing, patient provided a list of shelters for herself and boyfriend, she has not seen OB, but plans to call them on Monday

## 2025-02-10 ENCOUNTER — TELEPHONE (OUTPATIENT)
Dept: PHARMACY | Facility: HOSPITAL | Age: 25
End: 2025-02-10
Payer: MEDICAID

## 2025-02-10 LAB — BACTERIA SPEC AEROBE CULT: ABNORMAL

## 2025-02-10 NOTE — PROGRESS NOTES
Patient 17 weeks pregnant. Patient urine culture resulted with  S. saprophyticus . Susceptible to Nitrofurantoin. Patient was given Rx for amoxicillin-clavulanic acid. Therapy is insufficient coverage. Discussed with Dr. Triston Hester. New prescription for nitrofurantoin 100 mg PO BID x 5 days, ok to be used in pregnancy in 1st and 2nd trimesters. Unable to reach patient to discuss. Will e-scribe to the patient's preferred pharmacy when identified.    Microbiology Results (last 10 days)       Procedure Component Value - Date/Time    Blood Culture - Blood, Hand, Left [[id number]]  (Normal) Collected: 02/07/25 1823    Lab Status: Preliminary result Specimen: Blood from Hand, Left Updated: 02/09/25 1845     Blood Culture No growth at 2 days    Blood Culture - Blood, Arm, Right [[id number]]  (Normal) Collected: 02/07/25 1819    Lab Status: Preliminary result Specimen: Blood from Arm, Right Updated: 02/09/25 1845     Blood Culture No growth at 2 days    Urine Culture - Urine, Urine, Clean Catch [[id number]]  (Abnormal)  (Susceptibility) Collected: 02/07/25 1614    Lab Status: Edited Result - FINAL Specimen: Urine, Clean Catch Updated: 02/10/25 1301     Urine Culture >100,000 CFU/mL Staphylococcus saprophyticus    Narrative:      Colonization of the urinary tract without infection is common. Treatment is discouraged unless the patient is symptomatic, pregnant, or undergoing an invasive urologic procedure.    Susceptibility        Staphylococcus saprophyticus      RONNY      Clindamycin 0.5 ug/ml Resistant      Inducible Clindamycin Resistance POS ug/ml Positive      Nitrofurantoin <=16 ug/ml Susceptible (C)  [1]       Oxacillin >=4 ug/ml Resistant      Tetracycline <=1 ug/ml Susceptible      Trimethoprim + Sulfamethoxazole <=10 ug/ml Susceptible      Vancomycin <=0.5 ug/ml Susceptible                   [1]  Appended report. These results have been appended to a previously final verified report.                Susceptibility  Comments       Staphylococcus saprophyticus    This isolate is presumed to be clindamycin resistant based on detection of inducible clindamycin resistance.  Clindamycin may still be effective in some patients.  [place] pharmacist Kelly requested nitrofurantoin 02/10/25                       Kelly Jean RPH  2/10/2025 14:20 EST

## 2025-02-11 ENCOUNTER — TELEPHONE (OUTPATIENT)
Dept: PHARMACY | Facility: HOSPITAL | Age: 25
End: 2025-02-11
Payer: MEDICAID

## 2025-02-11 NOTE — TELEPHONE ENCOUNTER
Attempted to reach the patient to discuses urine culture results and antibiotic change, no answer, VM left to call back.

## 2025-02-12 LAB
BACTERIA SPEC AEROBE CULT: NORMAL
BACTERIA SPEC AEROBE CULT: NORMAL

## 2025-02-12 NOTE — PROGRESS NOTES
Patient is 17 weeks pregnant. Patient urine culture resulted with  S. saprophyticus . Susceptible to Nitrofurantoin. Patient was given Rx for amoxicillin-clavulanic acid. Therapy is insufficient coverage. Discussed with Dr. Triston Hester. New prescription for nitrofurantoin 100 mg PO BID x 5 days, ok to be used in pregnancy in 1st and 2nd trimesters. Unable to reach patient to discuss x 3 days, multiple voice messages left asking the patient to call back to discuss the results and new antibiotic therapy. At this point will consider this patient lost to follow up.     Microbiology Results (last 10 days)       Procedure Component Value - Date/Time    Blood Culture - Blood, Hand, Left [[id number]]  (Normal) Collected: 02/07/25 1823    Lab Status: Preliminary result Specimen: Blood from Hand, Left Updated: 02/11/25 1845     Blood Culture No growth at 4 days    Blood Culture - Blood, Arm, Right [[id number]]  (Normal) Collected: 02/07/25 1819    Lab Status: Preliminary result Specimen: Blood from Arm, Right Updated: 02/11/25 1845     Blood Culture No growth at 4 days    Urine Culture - Urine, Urine, Clean Catch [[id number]]  (Abnormal)  (Susceptibility) Collected: 02/07/25 1614    Lab Status: Edited Result - FINAL Specimen: Urine, Clean Catch Updated: 02/10/25 1301     Urine Culture >100,000 CFU/mL Staphylococcus saprophyticus    Narrative:      Colonization of the urinary tract without infection is common. Treatment is discouraged unless the patient is symptomatic, pregnant, or undergoing an invasive urologic procedure.    Susceptibility        Staphylococcus saprophyticus      RONNY      Clindamycin 0.5 ug/ml Resistant      Inducible Clindamycin Resistance POS ug/ml Positive      Nitrofurantoin <=16 ug/ml Susceptible (C)  [1]       Oxacillin >=4 ug/ml Resistant      Tetracycline <=1 ug/ml Susceptible      Trimethoprim + Sulfamethoxazole <=10 ug/ml Susceptible      Vancomycin <=0.5 ug/ml Susceptible                   [1]   Appended report. These results have been appended to a previously final verified report.                Susceptibility Comments       Staphylococcus saprophyticus    This isolate is presumed to be clindamycin resistant based on detection of inducible clindamycin resistance.  Clindamycin may still be effective in some patients.  [place] pharmacist Kelly requested nitrofurantoin 02/10/25                       Jannie BurgosD  2/12/2025 11:11 EST

## 2025-03-02 ENCOUNTER — HOSPITAL ENCOUNTER (OUTPATIENT)
Facility: HOSPITAL | Age: 25
Setting detail: OBSERVATION
Discharge: HOME | End: 2025-03-04
Attending: STUDENT IN AN ORGANIZED HEALTH CARE EDUCATION/TRAINING PROGRAM | Admitting: STUDENT IN AN ORGANIZED HEALTH CARE EDUCATION/TRAINING PROGRAM
Payer: MEDICAID

## 2025-03-02 DIAGNOSIS — Z34.90 PREGNANCY, UNSPECIFIED GESTATIONAL AGE: ICD-10-CM

## 2025-03-02 DIAGNOSIS — T50.904A DRUG OVERDOSE OF UNDETERMINED INTENT, INITIAL ENCOUNTER: ICD-10-CM

## 2025-03-02 DIAGNOSIS — I95.9 HYPOTENSION, UNSPECIFIED HYPOTENSION TYPE: Primary | ICD-10-CM

## 2025-03-02 LAB
ALBUMIN SERPL-MCNC: 3.9 G/DL (ref 3.5–5.2)
ALBUMIN/GLOB SERPL: 1.6 G/DL
ALP SERPL-CCNC: 44 U/L (ref 39–117)
ALT SERPL W P-5'-P-CCNC: 9 U/L (ref 1–33)
ANION GAP SERPL CALCULATED.3IONS-SCNC: 10.6 MMOL/L (ref 5–15)
APAP SERPL-MCNC: <5 MCG/ML (ref 0–30)
AST SERPL-CCNC: 13 U/L (ref 1–32)
B PARAPERT DNA SPEC QL NAA+PROBE: NOT DETECTED
B PERT DNA SPEC QL NAA+PROBE: NOT DETECTED
BASOPHILS # BLD AUTO: 0.03 10*3/MM3 (ref 0–0.2)
BASOPHILS NFR BLD AUTO: 0.2 % (ref 0–1.5)
BILIRUB SERPL-MCNC: 0.3 MG/DL (ref 0–1.2)
BUN SERPL-MCNC: 9 MG/DL (ref 6–20)
BUN/CREAT SERPL: 13.2 (ref 7–25)
C PNEUM DNA NPH QL NAA+NON-PROBE: NOT DETECTED
CALCIUM SPEC-SCNC: 9.3 MG/DL (ref 8.6–10.5)
CHLORIDE SERPL-SCNC: 106 MMOL/L (ref 98–107)
CO2 SERPL-SCNC: 20.4 MMOL/L (ref 22–29)
CREAT SERPL-MCNC: 0.68 MG/DL (ref 0.57–1)
D DIMER PPP FEU-MCNC: 0.49 MCGFEU/ML (ref 0–0.5)
D-LACTATE SERPL-SCNC: 0.5 MMOL/L (ref 0.3–2)
DEPRECATED RDW RBC AUTO: 47.1 FL (ref 37–54)
EGFRCR SERPLBLD CKD-EPI 2021: 126.5 ML/MIN/1.73
EOSINOPHIL # BLD AUTO: 0.03 10*3/MM3 (ref 0–0.4)
EOSINOPHIL NFR BLD AUTO: 0.2 % (ref 0.3–6.2)
ERYTHROCYTE [DISTWIDTH] IN BLOOD BY AUTOMATED COUNT: 13.7 % (ref 12.3–15.4)
ETHANOL UR QL: <0.01 %
FLUAV SUBTYP SPEC NAA+PROBE: NOT DETECTED
FLUBV RNA ISLT QL NAA+PROBE: NOT DETECTED
GLOBULIN UR ELPH-MCNC: 2.5 GM/DL
GLUCOSE BLDC GLUCOMTR-MCNC: 109 MG/DL (ref 70–105)
GLUCOSE SERPL-MCNC: 143 MG/DL (ref 65–99)
HADV DNA SPEC NAA+PROBE: NOT DETECTED
HCOV 229E RNA SPEC QL NAA+PROBE: NOT DETECTED
HCOV HKU1 RNA SPEC QL NAA+PROBE: NOT DETECTED
HCOV NL63 RNA SPEC QL NAA+PROBE: NOT DETECTED
HCOV OC43 RNA SPEC QL NAA+PROBE: NOT DETECTED
HCT VFR BLD AUTO: 35.5 % (ref 34–46.6)
HGB BLD-MCNC: 11.9 G/DL (ref 12–15.9)
HMPV RNA NPH QL NAA+NON-PROBE: NOT DETECTED
HOLD SPECIMEN: NORMAL
HOLD SPECIMEN: NORMAL
HPIV1 RNA ISLT QL NAA+PROBE: NOT DETECTED
HPIV2 RNA SPEC QL NAA+PROBE: NOT DETECTED
HPIV3 RNA NPH QL NAA+PROBE: NOT DETECTED
HPIV4 P GENE NPH QL NAA+PROBE: NOT DETECTED
IMM GRANULOCYTES # BLD AUTO: 0.08 10*3/MM3 (ref 0–0.05)
IMM GRANULOCYTES NFR BLD AUTO: 0.6 % (ref 0–0.5)
LYMPHOCYTES # BLD AUTO: 2 10*3/MM3 (ref 0.7–3.1)
LYMPHOCYTES NFR BLD AUTO: 15.3 % (ref 19.6–45.3)
M PNEUMO IGG SER IA-ACNC: NOT DETECTED
MCH RBC QN AUTO: 31.2 PG (ref 26.6–33)
MCHC RBC AUTO-ENTMCNC: 33.5 G/DL (ref 31.5–35.7)
MCV RBC AUTO: 93.2 FL (ref 79–97)
MONOCYTES # BLD AUTO: 0.69 10*3/MM3 (ref 0.1–0.9)
MONOCYTES NFR BLD AUTO: 5.3 % (ref 5–12)
NEUTROPHILS NFR BLD AUTO: 10.28 10*3/MM3 (ref 1.7–7)
NEUTROPHILS NFR BLD AUTO: 78.4 % (ref 42.7–76)
NRBC BLD AUTO-RTO: 0 /100 WBC (ref 0–0.2)
NT-PROBNP SERPL-MCNC: 57.2 PG/ML (ref 0–450)
PLATELET # BLD AUTO: 225 10*3/MM3 (ref 140–450)
PMV BLD AUTO: 10.6 FL (ref 6–12)
POTASSIUM SERPL-SCNC: 3.8 MMOL/L (ref 3.5–5.2)
PROT SERPL-MCNC: 6.4 G/DL (ref 6–8.5)
RBC # BLD AUTO: 3.81 10*6/MM3 (ref 3.77–5.28)
RHINOVIRUS RNA SPEC NAA+PROBE: NOT DETECTED
RSV RNA NPH QL NAA+NON-PROBE: NOT DETECTED
SALICYLATES SERPL-MCNC: <0.5 MG/DL
SARS-COV-2 RNA RESP QL NAA+PROBE: NOT DETECTED
SODIUM SERPL-SCNC: 137 MMOL/L (ref 136–145)
TROPONIN T SERPL HS-MCNC: <6 NG/L
WBC NRBC COR # BLD AUTO: 13.11 10*3/MM3 (ref 3.4–10.8)
WHOLE BLOOD HOLD COAG: NORMAL
WHOLE BLOOD HOLD SPECIMEN: NORMAL

## 2025-03-02 PROCEDURE — 82803 BLOOD GASES ANY COMBINATION: CPT

## 2025-03-02 PROCEDURE — 82077 ASSAY SPEC XCP UR&BREATH IA: CPT

## 2025-03-02 PROCEDURE — 83880 ASSAY OF NATRIURETIC PEPTIDE: CPT

## 2025-03-02 PROCEDURE — 83605 ASSAY OF LACTIC ACID: CPT

## 2025-03-02 PROCEDURE — 25810000003 SODIUM CHLORIDE 0.9 % SOLUTION

## 2025-03-02 PROCEDURE — 82948 REAGENT STRIP/BLOOD GLUCOSE: CPT

## 2025-03-02 PROCEDURE — 85025 COMPLETE CBC W/AUTO DIFF WBC: CPT

## 2025-03-02 PROCEDURE — 80053 COMPREHEN METABOLIC PANEL: CPT

## 2025-03-02 PROCEDURE — 96374 THER/PROPH/DIAG INJ IV PUSH: CPT

## 2025-03-02 PROCEDURE — 87040 BLOOD CULTURE FOR BACTERIA: CPT

## 2025-03-02 PROCEDURE — 80179 DRUG ASSAY SALICYLATE: CPT

## 2025-03-02 PROCEDURE — 0202U NFCT DS 22 TRGT SARS-COV-2: CPT

## 2025-03-02 PROCEDURE — 25010000002 NALOXONE HCL 2 MG/2ML SOLUTION PREFILLED SYRINGE

## 2025-03-02 PROCEDURE — 99285 EMERGENCY DEPT VISIT HI MDM: CPT

## 2025-03-02 PROCEDURE — 80143 DRUG ASSAY ACETAMINOPHEN: CPT

## 2025-03-02 PROCEDURE — 85379 FIBRIN DEGRADATION QUANT: CPT

## 2025-03-02 PROCEDURE — 84484 ASSAY OF TROPONIN QUANT: CPT

## 2025-03-02 PROCEDURE — 93005 ELECTROCARDIOGRAM TRACING: CPT

## 2025-03-02 RX ORDER — NALOXONE HYDROCHLORIDE 1 MG/ML
INJECTION INTRAMUSCULAR; INTRAVENOUS; SUBCUTANEOUS
Status: COMPLETED
Start: 2025-03-02 | End: 2025-03-02

## 2025-03-02 RX ORDER — NALOXONE HYDROCHLORIDE 1 MG/ML
2 INJECTION INTRAMUSCULAR; INTRAVENOUS; SUBCUTANEOUS ONCE
Status: COMPLETED | OUTPATIENT
Start: 2025-03-02 | End: 2025-03-02

## 2025-03-02 RX ORDER — SODIUM CHLORIDE 0.9 % (FLUSH) 0.9 %
10 SYRINGE (ML) INJECTION AS NEEDED
Status: DISCONTINUED | OUTPATIENT
Start: 2025-03-02 | End: 2025-03-04 | Stop reason: HOSPADM

## 2025-03-02 RX ADMIN — NALOXONE HYDROCHLORIDE 2 MG: 1 INJECTION INTRAMUSCULAR; INTRAVENOUS; SUBCUTANEOUS at 22:45

## 2025-03-02 RX ADMIN — SODIUM CHLORIDE 1000 ML: 0.9 INJECTION, SOLUTION INTRAVENOUS at 22:45

## 2025-03-02 NOTE — Clinical Note
Level of Care: Telemetry [5]   Admitting Physician: ALICE MIDDLETON [[id number]]   Attending Physician: ALICE MIDDLETON [[id number]]

## 2025-03-03 ENCOUNTER — APPOINTMENT (OUTPATIENT)
Dept: GENERAL RADIOLOGY | Facility: HOSPITAL | Age: 25
End: 2025-03-03
Payer: MEDICAID

## 2025-03-03 ENCOUNTER — APPOINTMENT (OUTPATIENT)
Dept: ULTRASOUND IMAGING | Facility: HOSPITAL | Age: 25
End: 2025-03-03
Payer: MEDICAID

## 2025-03-03 PROBLEM — R53.1 GENERALIZED WEAKNESS: Status: ACTIVE | Noted: 2025-03-03

## 2025-03-03 LAB
ALBUMIN SERPL-MCNC: 3.2 G/DL (ref 3.5–5.2)
ALBUMIN/GLOB SERPL: 1.5 G/DL
ALP SERPL-CCNC: 37 U/L (ref 39–117)
ALT SERPL W P-5'-P-CCNC: <5 U/L (ref 1–33)
AMPHET+METHAMPHET UR QL: NEGATIVE
AMPHETAMINES UR QL: NEGATIVE
ANION GAP SERPL CALCULATED.3IONS-SCNC: 10 MMOL/L (ref 5–15)
AST SERPL-CCNC: 11 U/L (ref 1–32)
ATMOSPHERIC PRESS: ABNORMAL MM[HG]
B-HCG UR QL: POSITIVE
BARBITURATES UR QL SCN: NEGATIVE
BASE EXCESS BLDV CALC-SCNC: -7.1 MMOL/L (ref -2–2)
BASOPHILS # BLD AUTO: 0.02 10*3/MM3 (ref 0–0.2)
BASOPHILS NFR BLD AUTO: 0.2 % (ref 0–1.5)
BDY SITE: ABNORMAL
BENZODIAZ UR QL SCN: NEGATIVE
BILIRUB SERPL-MCNC: 0.2 MG/DL (ref 0–1.2)
BILIRUB UR QL STRIP: NEGATIVE
BUN SERPL-MCNC: 8 MG/DL (ref 6–20)
BUN/CREAT SERPL: 12.1 (ref 7–25)
BUPRENORPHINE SERPL-MCNC: NEGATIVE NG/ML
C TRACH RRNA CVX QL NAA+PROBE: NOT DETECTED
CALCIUM SPEC-SCNC: 8.4 MG/DL (ref 8.6–10.5)
CANNABINOIDS SERPL QL: POSITIVE
CHLORIDE SERPL-SCNC: 104 MMOL/L (ref 98–107)
CLARITY UR: ABNORMAL
CO2 BLDA-SCNC: 21.3 MMOL/L (ref 22–29)
CO2 SERPL-SCNC: 22 MMOL/L (ref 22–29)
COCAINE UR QL: NEGATIVE
COLOR UR: YELLOW
CREAT SERPL-MCNC: 0.66 MG/DL (ref 0.57–1)
DEPRECATED RDW RBC AUTO: 47.6 FL (ref 37–54)
EGFRCR SERPLBLD CKD-EPI 2021: 127.4 ML/MIN/1.73
EOSINOPHIL # BLD AUTO: 0.07 10*3/MM3 (ref 0–0.4)
EOSINOPHIL NFR BLD AUTO: 0.8 % (ref 0.3–6.2)
ERYTHROCYTE [DISTWIDTH] IN BLOOD BY AUTOMATED COUNT: 13.8 % (ref 12.3–15.4)
GEN 5 1HR TROPONIN T REFLEX: <6 NG/L
GLOBULIN UR ELPH-MCNC: 2.2 GM/DL
GLUCOSE SERPL-MCNC: 78 MG/DL (ref 65–99)
GLUCOSE UR STRIP-MCNC: NEGATIVE MG/DL
HCO3 BLDV-SCNC: 19.9 MMOL/L (ref 22–26)
HCT VFR BLD AUTO: 29.8 % (ref 34–46.6)
HGB BLD-MCNC: 9.7 G/DL (ref 12–15.9)
HGB UR QL STRIP.AUTO: NEGATIVE
IMM GRANULOCYTES # BLD AUTO: 0.04 10*3/MM3 (ref 0–0.05)
IMM GRANULOCYTES NFR BLD AUTO: 0.5 % (ref 0–0.5)
INHALED O2 CONCENTRATION: 100 %
KETONES UR QL STRIP: ABNORMAL
LEUKOCYTE ESTERASE UR QL STRIP.AUTO: NEGATIVE
LYMPHOCYTES # BLD AUTO: 1.72 10*3/MM3 (ref 0.7–3.1)
LYMPHOCYTES NFR BLD AUTO: 19.7 % (ref 19.6–45.3)
MAGNESIUM SERPL-MCNC: 1.8 MG/DL (ref 1.6–2.6)
MCH RBC QN AUTO: 30.7 PG (ref 26.6–33)
MCHC RBC AUTO-ENTMCNC: 32.6 G/DL (ref 31.5–35.7)
MCV RBC AUTO: 94.3 FL (ref 79–97)
METHADONE UR QL SCN: NEGATIVE
MODALITY: ABNORMAL
MONOCYTES # BLD AUTO: 0.67 10*3/MM3 (ref 0.1–0.9)
MONOCYTES NFR BLD AUTO: 7.7 % (ref 5–12)
N GONORRHOEA RRNA SPEC QL NAA+PROBE: NOT DETECTED
NEUTROPHILS NFR BLD AUTO: 6.2 10*3/MM3 (ref 1.7–7)
NEUTROPHILS NFR BLD AUTO: 71.1 % (ref 42.7–76)
NITRITE UR QL STRIP: NEGATIVE
NRBC BLD AUTO-RTO: 0 /100 WBC (ref 0–0.2)
OPIATES UR QL: POSITIVE
OXYCODONE UR QL SCN: NEGATIVE
PCO2 BLDV: 45.1 MM HG (ref 42–51)
PCP UR QL SCN: NEGATIVE
PH BLDV: 7.25 PH UNITS (ref 7.32–7.43)
PH UR STRIP.AUTO: <=5 [PH] (ref 5–8)
PHOSPHATE SERPL-MCNC: 3.4 MG/DL (ref 2.5–4.5)
PLATELET # BLD AUTO: 216 10*3/MM3 (ref 140–450)
PMV BLD AUTO: 10.4 FL (ref 6–12)
PO2 BLDV: 26.4 MM HG (ref 40–42)
POTASSIUM SERPL-SCNC: 3.7 MMOL/L (ref 3.5–5.2)
PROT SERPL-MCNC: 5.4 G/DL (ref 6–8.5)
PROT UR QL STRIP: ABNORMAL
QT INTERVAL: 472 MS
QTC INTERVAL: 435 MS
RBC # BLD AUTO: 3.16 10*6/MM3 (ref 3.77–5.28)
SAO2 % BLDCOV: 39.5 % (ref 45–75)
SODIUM SERPL-SCNC: 136 MMOL/L (ref 136–145)
SP GR UR STRIP: 1.01 (ref 1–1.03)
TRICYCLICS UR QL SCN: NEGATIVE
TROPONIN T NUMERIC DELTA: NORMAL
UROBILINOGEN UR QL STRIP: ABNORMAL
WBC NRBC COR # BLD AUTO: 8.72 10*3/MM3 (ref 3.4–10.8)

## 2025-03-03 PROCEDURE — 81003 URINALYSIS AUTO W/O SCOPE: CPT

## 2025-03-03 PROCEDURE — 76815 OB US LIMITED FETUS(S): CPT

## 2025-03-03 PROCEDURE — 84484 ASSAY OF TROPONIN QUANT: CPT

## 2025-03-03 PROCEDURE — 83735 ASSAY OF MAGNESIUM: CPT | Performed by: STUDENT IN AN ORGANIZED HEALTH CARE EDUCATION/TRAINING PROGRAM

## 2025-03-03 PROCEDURE — 96361 HYDRATE IV INFUSION ADD-ON: CPT

## 2025-03-03 PROCEDURE — 71045 X-RAY EXAM CHEST 1 VIEW: CPT

## 2025-03-03 PROCEDURE — 81025 URINE PREGNANCY TEST: CPT | Performed by: STUDENT IN AN ORGANIZED HEALTH CARE EDUCATION/TRAINING PROGRAM

## 2025-03-03 PROCEDURE — 87491 CHLMYD TRACH DNA AMP PROBE: CPT

## 2025-03-03 PROCEDURE — G0378 HOSPITAL OBSERVATION PER HR: HCPCS

## 2025-03-03 PROCEDURE — 87591 N.GONORRHOEAE DNA AMP PROB: CPT

## 2025-03-03 PROCEDURE — 84100 ASSAY OF PHOSPHORUS: CPT | Performed by: STUDENT IN AN ORGANIZED HEALTH CARE EDUCATION/TRAINING PROGRAM

## 2025-03-03 PROCEDURE — 63710000001 ONDANSETRON ODT 4 MG TABLET DISPERSIBLE: Performed by: STUDENT IN AN ORGANIZED HEALTH CARE EDUCATION/TRAINING PROGRAM

## 2025-03-03 PROCEDURE — 80306 DRUG TEST PRSMV INSTRMNT: CPT

## 2025-03-03 PROCEDURE — 76817 TRANSVAGINAL US OBSTETRIC: CPT

## 2025-03-03 PROCEDURE — 36415 COLL VENOUS BLD VENIPUNCTURE: CPT

## 2025-03-03 PROCEDURE — 80053 COMPREHEN METABOLIC PANEL: CPT | Performed by: STUDENT IN AN ORGANIZED HEALTH CARE EDUCATION/TRAINING PROGRAM

## 2025-03-03 PROCEDURE — 25810000003 SODIUM CHLORIDE 0.9 % SOLUTION

## 2025-03-03 PROCEDURE — 85025 COMPLETE CBC W/AUTO DIFF WBC: CPT | Performed by: STUDENT IN AN ORGANIZED HEALTH CARE EDUCATION/TRAINING PROGRAM

## 2025-03-03 RX ORDER — GABAPENTIN 300 MG/1
300 CAPSULE ORAL EVERY 8 HOURS PRN
Status: DISCONTINUED | OUTPATIENT
Start: 2025-03-03 | End: 2025-03-04 | Stop reason: HOSPADM

## 2025-03-03 RX ORDER — ACETAMINOPHEN 325 MG/1
650 TABLET ORAL EVERY 6 HOURS PRN
Status: DISCONTINUED | OUTPATIENT
Start: 2025-03-03 | End: 2025-03-04 | Stop reason: HOSPADM

## 2025-03-03 RX ORDER — HYDROXYZINE HYDROCHLORIDE 25 MG/1
25 TABLET, FILM COATED ORAL EVERY 12 HOURS PRN
Status: DISCONTINUED | OUTPATIENT
Start: 2025-03-03 | End: 2025-03-04 | Stop reason: HOSPADM

## 2025-03-03 RX ORDER — PRENATAL VIT/IRON FUM/FOLIC AC 27MG-0.8MG
1 TABLET ORAL DAILY
Status: DISCONTINUED | OUTPATIENT
Start: 2025-03-04 | End: 2025-03-04 | Stop reason: HOSPADM

## 2025-03-03 RX ORDER — ONDANSETRON 4 MG/1
4 TABLET, ORALLY DISINTEGRATING ORAL EVERY 4 HOURS PRN
Status: DISCONTINUED | OUTPATIENT
Start: 2025-03-03 | End: 2025-03-04 | Stop reason: HOSPADM

## 2025-03-03 RX ORDER — GABAPENTIN 300 MG/1
300 CAPSULE ORAL 3 TIMES DAILY
Status: DISCONTINUED | OUTPATIENT
Start: 2025-03-03 | End: 2025-03-03

## 2025-03-03 RX ORDER — SODIUM CHLORIDE 0.9 % (FLUSH) 0.9 %
10 SYRINGE (ML) INJECTION EVERY 12 HOURS SCHEDULED
Status: DISCONTINUED | OUTPATIENT
Start: 2025-03-03 | End: 2025-03-04 | Stop reason: HOSPADM

## 2025-03-03 RX ORDER — PROMETHAZINE HYDROCHLORIDE 25 MG/1
12.5 TABLET ORAL EVERY 12 HOURS PRN
Status: DISCONTINUED | OUTPATIENT
Start: 2025-03-03 | End: 2025-03-04 | Stop reason: HOSPADM

## 2025-03-03 RX ORDER — SODIUM CHLORIDE 0.9 % (FLUSH) 0.9 %
10 SYRINGE (ML) INJECTION AS NEEDED
Status: DISCONTINUED | OUTPATIENT
Start: 2025-03-03 | End: 2025-03-04 | Stop reason: HOSPADM

## 2025-03-03 RX ORDER — PRENATAL VIT/IRON FUM/FOLIC AC 27MG-0.8MG
1 TABLET ORAL DAILY
COMMUNITY
Start: 2025-02-12

## 2025-03-03 RX ORDER — SODIUM CHLORIDE 9 MG/ML
40 INJECTION, SOLUTION INTRAVENOUS AS NEEDED
Status: DISCONTINUED | OUTPATIENT
Start: 2025-03-03 | End: 2025-03-04 | Stop reason: HOSPADM

## 2025-03-03 RX ORDER — SODIUM CHLORIDE 9 MG/ML
125 INJECTION, SOLUTION INTRAVENOUS CONTINUOUS
Status: DISPENSED | OUTPATIENT
Start: 2025-03-03 | End: 2025-03-04

## 2025-03-03 RX ORDER — MICONAZOLE NITRATE 20 MG/G
1 CREAM TOPICAL 2 TIMES DAILY
COMMUNITY

## 2025-03-03 RX ORDER — FAMOTIDINE 40 MG/5ML
40 POWDER, FOR SUSPENSION ORAL EVERY 12 HOURS PRN
Status: DISCONTINUED | OUTPATIENT
Start: 2025-03-03 | End: 2025-03-04 | Stop reason: HOSPADM

## 2025-03-03 RX ADMIN — ONDANSETRON 4 MG: 4 TABLET, ORALLY DISINTEGRATING ORAL at 20:12

## 2025-03-03 RX ADMIN — SODIUM CHLORIDE 125 ML/HR: 9 INJECTION, SOLUTION INTRAVENOUS at 14:22

## 2025-03-03 RX ADMIN — HYDROXYZINE HYDROCHLORIDE 25 MG: 25 TABLET, FILM COATED ORAL at 22:07

## 2025-03-03 RX ADMIN — SODIUM CHLORIDE 125 ML/HR: 9 INJECTION, SOLUTION INTRAVENOUS at 20:00

## 2025-03-03 RX ADMIN — SODIUM CHLORIDE 125 ML/HR: 9 INJECTION, SOLUTION INTRAVENOUS at 02:27

## 2025-03-03 NOTE — PROGRESS NOTES
[facility] MEDICINE SERVICE  TRANSFER OF CARE/ACCEPTANCE NOTE    PATIENT NAME: Jordana Herzog  : 2000  MRN: [id number]     Active Hospital Problems    Diagnosis  POA    **Generalized weakness [R53.1]  Yes      Resolved Hospital Problems   No resolved problems to display.       Patient seen and examined by me on 25 at 1000.  Interim History:  Patient reports feeling improved    Will defer to OB expertise for the ordered pregnancy scans.  Case management and social work to see patient regarding issues of homelessness combined with pregnancy and OUD.    I have reviewed the H&P, diagnostic data and plan of care for Jordana Herzog.  I will be taking over care of this patient during the current hospitalization.        Signature: Electronically signed by Edmar Locke MD, 25, 14:50 EST.  [facility] Hospitalist Team

## 2025-03-03 NOTE — NURSING NOTE
This RN called and spoke with Sada from social work. Sada stated that she or another  would be up to the unit today.

## 2025-03-03 NOTE — CASE MANAGEMENT/SOCIAL WORK
Social Work Assessment  [facility]     Patient Name: Jordana Herzog  MRN: [id number]  Today's Date: 3/3/2025    Admit Date: 3/2/2025     Demographic Summary       Row Name 03/03/25 1632       General Information    Admission Type inpatient    Arrived From emergency department    Referral Source admission list    Reason for Consult substance use concerns       Contact Information    Permission Granted to Share Info With            Substance Abuse       Row Name 03/03/25 1632       Substance Use    Substance Use Status current street drug/inhalant/medication abuse  Illicit fentanyl    Last Street Drug/Medication/Inhalant Use 03/03/25    Readiness to Change Street Use contemplation    Attempts to Quit Street Drug Use none    Substance Use Comment LSW consulted re: resources for NILAY. LSW met w/Pt at bedside in room M417 with latesha Dennison present upon consent. Pt confirmed she is not established with a PCP. Pt reports knowingly consuming illicit fentanyl prior to coming to the ED, stating she has been using for approx. 1 ½ - 2 years. Pt reports she has never attempted to NILAY treatment, stating she was admitted for 72 hours at [facility] in the past. Pt reports her and kristofer, who also uses illicit fentanyl, are unsheltered, stating they stay in a non-working vehicle. Pt reports she aged out of the foster care system at the age of 20 y.o., stating she went to live with mother afterward and was evicted due to mother’s polysubstance use/inability to pay rent and has been unsheltered since. Pt agreeable to LSW contacting resources for In-Pt NILAY treatment. LSW contacted [facility] and spoke to Melanie, who reports Pt will have to complete an assessment to determine eligibility. Pt declined due to the facility being so far away. LSW contacted Manuel AVILA with [facility] who placed a referral. LSW received a call back from [facility] who reported Pt’s insurance is not  accepted, stating [facility] is an accepting facility. Pt declined LSW calling [facility], stating her sister was just released from the foster care system. Pt agreeable to resources. LSW gave Pt [facility] NILAY resource packet and reviewed the listed resources. LSW educated Pt on importance of seeking treatment and encouraged Pt to consider Suboxone. Pt confirmed understanding and reports the safety of her unborn child is what is most important. Pt thanked this writer for being supportive and assisting Pt. No further needs identified at this time.           Sada Linda, [facility]  Medical Social Worker

## 2025-03-03 NOTE — PLAN OF CARE
Problem: Adult Inpatient Plan of Care  Goal: Plan of Care Review  Outcome: Progressing  Goal: Patient-Specific Goal (Individualized)  Outcome: Progressing  Goal: Absence of Hospital-Acquired Illness or Injury  Outcome: Progressing  Intervention: Identify and Manage Fall Risk  Recent Flowsheet Documentation  Taken 3/3/2025 1000 by Yesica Pizano RN  Safety Promotion/Fall Prevention: safety round/check completed  Taken 3/3/2025 0800 by Yesica Pizano RN  Safety Promotion/Fall Prevention: safety round/check completed  Intervention: Prevent Skin Injury  Recent Flowsheet Documentation  Taken 3/3/2025 1000 by Yesica Pizano RN  Body Position: position changed independently  Taken 3/3/2025 0800 by Yesica Pizano RN  Body Position: position changed independently  Goal: Optimal Comfort and Wellbeing  Outcome: Progressing  Intervention: Provide Person-Centered Care  Recent Flowsheet Documentation  Taken 3/3/2025 0800 by Yesica Pizano RN  Trust Relationship/Rapport:   care explained   choices provided   emotional support provided   empathic listening provided   questions answered   questions encouraged   reassurance provided   thoughts/feelings acknowledged  Goal: Readiness for Transition of Care  Outcome: Progressing  Intervention: Mutually Develop Transition Plan  Recent Flowsheet Documentation  Taken 3/3/2025 1009 by Yesica Pizano RN  Transportation Anticipated: car, drives self  Patient/Family Anticipated Services at Transition:   Patient/Family Anticipates Transition to: (pt lives in a car) other (see comments)  Taken 3/3/2025 1007 by Yesica Pizano RN  Equipment Currently Used at Home: none     Problem: Fall Injury Risk  Goal: Absence of Fall and Fall-Related Injury  Outcome: Progressing  Intervention: Promote Injury-Free Environment  Recent Flowsheet Documentation  Taken 3/3/2025 1000 by Yesica Pizano RN  Safety Promotion/Fall Prevention: safety round/check completed  Taken 3/3/2025 0800 by  Yesica Pizano, RN  Safety Promotion/Fall Prevention: safety round/check completed   Goal Outcome Evaluation:

## 2025-03-03 NOTE — NURSING NOTE
Report given to Joyce on 4th floor pt is going to room 417   "I supervised care provided by the midlevel provider.    We have discussed this patient's history, physical exam, and treatment plan.   I have reviewed the note and personally saw and examined the patient and agree with the plan of care.        Pt presents to the ED c/o right knee pain onset last night. No known injuries sustained to the RLE recently. Pt denies focal weakness/numbness, dyspnea, and CP. Pt reports that he had a similar episode of right knee pain about 2 months ago for which pt was seen at an orthopedist's office; at that time, pt was diagnosed with \"patellar tendonitis\" for which pt was prescribed with NSAIDs. On physical exam, there is tenderness to the right patellar tendon. There is no joint effusion present. There is no tenderness to the lateral aspect of the right knee. Pt's right knee X-Ray shows no acute fracture or knee effusion. Pt was prescribed with rx for small amount of pain medicine for discomfort. Pt was provided with f/u referral to the orthopedist.               Documentation assistance provided by Miriam Williamson. Information recorded by the scribe was done at my direction and has been verified and validated by me.     Entered by Miriam Williamson, acting as scribe for Dr. David MD.            Miriam Williamson  08/20/17 [id number]       Julian Hernandez MD  08/20/17 [id number]    "

## 2025-03-03 NOTE — ED NOTES
Fetal heart tones auscultated per labor and delivery nurseRuth RN. Heart rate averaged at 126 bpm. Provider notified.

## 2025-03-03 NOTE — ED PROVIDER NOTES
Subjective   Chief Complaint   Patient presents with    Weakness - Generalized       History of Present Illness  Patient is a 24-year-old young lady who comes into the ED today with reports that she has had low blood pressure, felt really tired and weak, feels that her heart rate is really low for the last couple of months.  She is not sure if it is related to her pregnancy.  She denies any substance use or abuse.  States she is between 18 and 20 weeks pregnant however has not gotten any prenatal care.  He is scheduled to see an OB on March 5 per patient.  Review of Systems   Constitutional:  Positive for activity change.   Neurological:  Positive for syncope, weakness and light-headedness.   Psychiatric/Behavioral:  Positive for decreased concentration.    All other systems reviewed and are negative.      Past Medical History:   Diagnosis Date    Acid reflux     Allergic     Anxiety     Bipolar disorder     Depression     Sinusitis     Strep throat        Allergies   Allergen Reactions    Nuts Anaphylaxis     All nuts    Peanut (Diagnostic) Hives, Shortness Of Breath and Swelling       Past Surgical History:   Procedure Laterality Date    ADENOIDECTOMY      TONSILLECTOMY      WISDOM TOOTH EXTRACTION         Family History   Adopted: Yes   Problem Relation Age of Onset    ADD / ADHD Mother     Depression Mother     Anxiety disorder Mother     Bipolar disorder Mother     Arthritis Mother     Anemia Mother     ADD / ADHD Father     Depression Father     Anxiety disorder Father     Anemia Sister     Heart murmur Sister     Obesity Paternal Uncle        Social History     Socioeconomic History    Marital status: Single   Tobacco Use    Smoking status: Former     Types: Electronic Cigarette     Start date: 8/13/2019     Passive exposure: Yes    Smokeless tobacco: Former    Tobacco comments:     Parents smoked in home for 13 years; pt vapes   Vaping Use    Vaping status: Every Day   Substance and Sexual Activity     Alcohol use: No    Drug use: Defer    Sexual activity: Defer           Objective   Physical Exam  Vitals and nursing note reviewed.   Constitutional:       General: She is not in acute distress.     Appearance: Normal appearance. She is ill-appearing. She is not toxic-appearing or diaphoretic.   HENT:      Head: Normocephalic and atraumatic.      Right Ear: Tympanic membrane, ear canal and external ear normal.      Left Ear: Tympanic membrane, ear canal and external ear normal.      Nose: Nose normal.      Mouth/Throat:      Mouth: Mucous membranes are moist.      Pharynx: Oropharynx is clear.   Eyes:      Extraocular Movements: Extraocular movements intact.      Conjunctiva/sclera: Conjunctivae normal.      Pupils: Pupils are equal, round, and reactive to light.   Cardiovascular:      Rate and Rhythm: Normal rate and regular rhythm.      Pulses: Normal pulses.      Heart sounds: Normal heart sounds.   Pulmonary:      Effort: Pulmonary effort is normal.      Breath sounds: Normal breath sounds.   Abdominal:      General: Bowel sounds are normal.      Palpations: Abdomen is soft.   Musculoskeletal:         General: Normal range of motion.      Cervical back: Normal range of motion and neck supple.   Skin:     General: Skin is warm and dry.      Capillary Refill: Capillary refill takes less than 2 seconds.   Neurological:      General: No focal deficit present.      Mental Status: She is alert.   Psychiatric:         Attention and Perception: She is inattentive.         Mood and Affect: Affect is blunt and flat.         Speech: Speech is delayed.         Behavior: Behavior is slowed and withdrawn.         Thought Content: Thought content does not include homicidal or suicidal plan.         Cognition and Memory: Cognition is impaired. Memory is impaired.         Procedures           ED Course  ED Course as of 03/03/25 0448   Sun Mar 02, 2025   4308 Patient became hypoxic and started on nasal cannula.  This was not  "sufficient and a nonrebreather was placed.  She was then given 2 of Narcan and heart rate improved blood pressure improved and patient's baseline was improved.  Patient then admitted to taking fentanyl today and states that she takes it daily. [DT]   2324 Women's care nurse came down to assist with fetal heart tones and got 126 consistently [DT]      ED Course User Index  [DT] Erin Herzog, APRN      BP 94/70   Pulse 93   Temp 96.6 °F (35.9 °C) Comment (Src): unable to obtain axillary or oral temp in the triage area  Resp 16   Ht 157.5 cm (62\")   Wt 60.7 kg (133 lb 13.1 oz)   LMP  (LMP Unknown)   SpO2 100%   BMI 24.48 kg/m²   Labs Reviewed   COMPREHENSIVE METABOLIC PANEL - Abnormal; Notable for the following components:       Result Value    Glucose 143 (*)     CO2 20.4 (*)     All other components within normal limits    Narrative:     GFR Categories in Chronic Kidney Disease (CKD)      GFR Category          GFR (mL/min/1.73)    Interpretation  G1                     90 or greater         Normal or high (1)  G2                      60-89                Mild decrease (1)  G3a                   45-59                Mild to moderate decrease  G3b                   30-44                Moderate to severe decrease  G4                    15-29                Severe decrease  G5                    14 or less           Kidney failure          (1)In the absence of evidence of kidney disease, neither GFR category G1 or G2 fulfill the criteria for CKD.    eGFR calculation 2021 CKD-EPI creatinine equation, which does not include race as a factor   URINALYSIS W/ MICROSCOPIC IF INDICATED (NO CULTURE) - Abnormal; Notable for the following components:    Appearance, UA Cloudy (*)     Ketones, UA Trace (*)     Protein, UA Trace (*)     All other components within normal limits    Narrative:     Urine microscopic not indicated.   CBC WITH AUTO DIFFERENTIAL - Abnormal; Notable for the following components:    WBC 13.11 " (*)     Hemoglobin 11.9 (*)     Neutrophil % 78.4 (*)     Lymphocyte % 15.3 (*)     Eosinophil % 0.2 (*)     Immature Grans % 0.6 (*)     Neutrophils, Absolute 10.28 (*)     Immature Grans, Absolute 0.08 (*)     All other components within normal limits   URINE DRUG SCREEN - Abnormal; Notable for the following components:    THC, Screen, Urine Positive (*)     Opiate Screen Positive (*)     All other components within normal limits    Narrative:     Cutoff For Drugs Screened:    Amphetamines               500 ng/ml  Barbiturates               200 ng/ml  Benzodiazepines            150 ng/ml  Cocaine                    150 ng/ml  Methadone                  200 ng/ml  Opiates                    100 ng/ml  Phencyclidine               25 ng/ml  THC                         50 ng/ml  Methamphetamine            500 ng/ml  Tricyclic Antidepressants  300 ng/ml  Oxycodone                  100 ng/ml  Buprenorphine               10 ng/ml    The normal value for all drugs tested is negative. This report includes unconfirmed screening results, with the cutoff values listed, to be used for medical treatment purposes only.  Unconfirmed results must not be used for non-medical purposes such as employment or legal testing.  Clinical consideration should be applied to any drug of abuse test, particularly when unconfirmed results are used.    All urine drugs of abuse requests without chain of custody are for medical screening purposes only.  False positives are possible.     BLOOD GAS, VENOUS - Abnormal; Notable for the following components:    pH, Venous 7.253 (*)     pO2, Venous 26.4 (*)     HCO3, Venous 19.9 (*)     Base Excess, Venous -7.1 (*)     O2 Saturation, Venous 39.5 (*)     CO2 Content 21.3 (*)     All other components within normal limits   PREGNANCY, URINE - Abnormal; Notable for the following components:    HCG, Urine QL Positive (*)     All other components within normal limits   POCT GLUCOSE FINGERSTICK - Abnormal;  Notable for the following components:    Glucose 109 (*)     All other components within normal limits   RESPIRATORY PANEL PCR W/ COVID-19 (SARS-COV-2), NP SWAB IN UTM/VTP, 2 HR TAT - Normal    Narrative:     In the setting of a positive respiratory panel with a viral infection PLUS a negative procalcitonin without other underlying concern for bacterial infection, consider observing off antibiotics or discontinuation of antibiotics and continue supportive care. If the respiratory panel is positive for atypical bacterial infection (Bordetella pertussis, Chlamydophila pneumoniae, or Mycoplasma pneumoniae), consider antibiotic de-escalation to target atypical bacterial infection.   CHLAMYDIA TRACHOMATIS, NEISSERIA GONORRHOEAE, PCR - Normal   ACETAMINOPHEN LEVEL - Normal    Narrative:     Acetaminophen Therapeutic Range  5-20 ug/mL      Hours after ingestion            Toxic Value    4 Hours                           150 ug/mL    8 Hours                            70 ug/mL   12 Hours                            40 ug/mL   16 Hours                            20 ug/mL    These values apply to a single ingestion only.    SALICYLATE LEVEL - Normal   TROPONIN - Normal    Narrative:     High Sensitive Troponin T Reference Range:  <14.0 ng/L- Negative Female for AMI  <22.0 ng/L- Negative Male for AMI  >=14 - Abnormal Female indicating possible myocardial injury.  >=22 - Abnormal Male indicating possible myocardial injury.   Clinicians would have to utilize clinical acumen, EKG, Troponin, and serial changes to determine if it is an Acute Myocardial Infarction or myocardial injury due to an underlying chronic condition.        BNP (IN-HOUSE) - Normal    Narrative:     This assay is used as an aid in the diagnosis of individuals suspected of having heart failure. It can be used as an aid in the diagnosis of acute decompensated heart failure (ADHF) in patients presenting with signs and symptoms of ADHF to the emergency department  "(ED). In addition, NT-proBNP of <300 pg/mL indicates ADHF is not likely.    Age Range Result Interpretation  NT-proBNP Concentration (pg/mL:      <50             Positive            >450                   Gray                 300-450                    Negative             <300    50-75           Positive            >900                  Gray                300-900                  Negative            <300      >75             Positive            >1800                  Gray                300-1800                  Negative            <300   D-DIMER, QUANTITATIVE - Normal    Narrative:     According to the assay 's published package insert, a normal (<0.50 MCGFEU/mL) D-dimer result in conjunction with a non-high clinical probability assessment, excludes deep vein thrombosis (DVT) and pulmonary embolism (PE) with high sensitivity.    D-dimer values increase with age and this can make VTE exclusion of an older population difficult. To address this, the [employer], based on best available evidence and recent guidelines, recommends that clinicians use age-adjusted D-dimer thresholds in patients greater than 50 years of age with: a) a low probability of PE who do not meet all Pulmonary Embolism Rule Out Criteria, or b) in those with intermediate probability of PE.   The formula for an age-adjusted D-dimer cut-off is \"age/100\".  For example, a 60 year old patient would have an age-adjusted cut-off of 0.60 MCGFEU/mL and an 80 year old 0.80 MCGFEU/mL.   POC LACTATE - Normal   BLOOD CULTURE   BLOOD CULTURE   RAINBOW DRAW    Narrative:     The following orders were created for panel order [place] Draw.  Procedure                               Abnormality         Status                     ---------                               -----------         ------                     Green Top (Gel)[[id number]]                                  Final result               Lavender Top[[id number]]              "                        Final result               Gold Top - SST[[id number]]                                   Final result               Light Blue Top[[id number]]                                   Final result                 Please view results for these tests on the individual orders.   ETHANOL    Narrative:     Plasma Ethanol Clinical Symptoms:    ETOH (%)               Clinical Symptom  .01-.05              No apparent influence  .03-.12              Euphoria, Diminished judgment and attention   .09-.25              Impaired comprehension, Muscle incoordination  .18-.30              Confusion, Staggered gait, Slurred speech  .25-.40              Markedly decreased response to stimuli, unable to stand or                        walk, vomitting, sleep or stupor  .35-.50              Comatose, Anesthesia, Subnormal body temperature       HIGH SENSITIVITIY TROPONIN T 1HR    Narrative:     High Sensitive Troponin T Reference Range:  <14.0 ng/L- Negative Female for AMI  <22.0 ng/L- Negative Male for AMI  >=14 - Abnormal Female indicating possible myocardial injury.  >=22 - Abnormal Male indicating possible myocardial injury.   Clinicians would have to utilize clinical acumen, EKG, Troponin, and serial changes to determine if it is an Acute Myocardial Infarction or myocardial injury due to an underlying chronic condition.        BLOOD GAS, VENOUS   CBC AND DIFFERENTIAL    Narrative:     The following orders were created for panel order CBC & Differential.  Procedure                               Abnormality         Status                     ---------                               -----------         ------                     CBC Auto Differential[[id number]]        Abnormal            Final result                 Please view results for these tests on the individual orders.   GREEN TOP   LAVENDER TOP   GOLD TOP - SST   LIGHT BLUE TOP     Medications   sodium chloride 0.9 % flush 10 mL (has no administration in time  range)   sodium chloride 0.9 % infusion (125 mL/hr Intravenous New Bag 3/3/25 0227)   sodium chloride 0.9 % flush 10 mL (has no administration in time range)   sodium chloride 0.9 % flush 10 mL (has no administration in time range)   sodium chloride 0.9 % infusion 40 mL (has no administration in time range)   Potassium Replacement - Follow Nurse / BPA Driven Protocol (has no administration in time range)   Magnesium Standard Dose Replacement - Follow Nurse / BPA Driven Protocol (has no administration in time range)   Phosphorus Replacement - Follow Nurse / BPA Driven Protocol (has no administration in time range)   Calcium Replacement - Follow Nurse / BPA Driven Protocol (has no administration in time range)   sodium chloride 0.9 % bolus 1,000 mL (0 mL Intravenous Stopped 3/2/25 2315)   Naloxone HCl (NARCAN) injection 2 mg (2 mg Intravenous Given 3/2/25 2245)     XR Chest 1 View    Result Date: 3/3/2025  Impression: No acute cardiopulmonary process. Electronically Signed: Olimpia Serna MD  3/3/2025 12:32 AM EST  Workstation ID: [id number]                                                    Medical Decision Making  Problems Addressed:  Drug overdose of undetermined intent, initial encounter: complicated acute illness or injury  Hypotension, unspecified hypotension type: complicated acute illness or injury  Pregnancy, unspecified gestational age: complicated acute illness or injury    Amount and/or Complexity of Data Reviewed  Labs: ordered.  Radiology: ordered.  ECG/medicine tests: ordered.    Risk  Prescription drug management.  Decision regarding hospitalization.    Patient presents to the ED for the above complaint, underwent the above exam and workup.    EKG reviewed: EKG is reviewed and interpreted by Dr. Mireles shows sinus bradycardia with a PVC rate of 51 no PACs or other ectopy.  No previous for comparison.    Imaging reviewed: Imaging as above as reviewed and interpreted by Dr. Mireles ED attending.    Reviewed  multiple records since 2/7/2025 for urgent care and ER visits to include nausea sore throat vomiting dehydration UTI breast pain and then today's visit.    Differential diagnosis considered: Drug use or abuse, anemia, viral illness    Patient was treated with the following while in the ED:  Medications   sodium chloride 0.9 % flush 10 mL (has no administration in time range)   sodium chloride 0.9 % infusion (125 mL/hr Intravenous New Bag 3/3/25 0227)   sodium chloride 0.9 % flush 10 mL (has no administration in time range)   sodium chloride 0.9 % flush 10 mL (has no administration in time range)   sodium chloride 0.9 % infusion 40 mL (has no administration in time range)   Potassium Replacement - Follow Nurse / BPA Driven Protocol (has no administration in time range)   Magnesium Standard Dose Replacement - Follow Nurse / BPA Driven Protocol (has no administration in time range)   Phosphorus Replacement - Follow Nurse / BPA Driven Protocol (has no administration in time range)   Calcium Replacement - Follow Nurse / BPA Driven Protocol (has no administration in time range)   sodium chloride 0.9 % bolus 1,000 mL (0 mL Intravenous Stopped 3/2/25 2315)   Naloxone HCl (NARCAN) injection 2 mg (2 mg Intravenous Given 3/2/25 2245)     Upon evaluation patient IV was established labs imaging EKG obtained.  Results all as above.  Patient was given some IV fluid.  Upon initial evaluation of patient she denies any drug use or abuse.  However during her stay in the ED she became hypoxic even on a nonrebreather so Narcan was administered and patient woke up quickly and has had 100% on room air oxygen since.  Her heart rate is much improved.  Her blood pressure is still soft 90s over 70s.  Orthostatic blood pressures were obtained and she was not orthostatic and blood pressure however her heart did  a little bit during this.  After she wasgiven narcan and woke upand patient admitted to taking fentanyl at some point during  the day.  She states she takes fentanyl every day.  She has not prescribed fentanyl.  Not followed up with her appropriate medical caregivers.  I discussed this case extensively with Dr. Mireles and Dr. Hester decision was made to admit for IV fluids for blood pressure as well as possible visit with OB/GYN.    Discussed case with Dr Marie, who agreed to admit patient.      Final diagnoses:   Hypotension, unspecified hypotension type   Drug overdose of undetermined intent, initial encounter   Pregnancy, unspecified gestational age       ED Disposition  ED Disposition       ED Disposition   Decision to Admit    Condition   --    Comment   Level of Care: Med/Surg [1]   Diagnosis: Generalized weakness [534631]   Admitting Physician: ALICE MARIE [[id number]]                 No follow-up provider specified.       Medication List      No changes were made to your prescriptions during this visit.            Erin Herzog, APRN  03/03/25 0452

## 2025-03-03 NOTE — CASE MANAGEMENT/SOCIAL WORK
Social Work Assessment   Justo     Patient Name: Jordana Herzog  MRN: [id number]  Today's Date: 3/3/2025    Admit Date: 3/2/2025     Demographic Summary       Row Name 03/03/25 1614       General Information    Reason for Consult substance use concerns;housing concerns/homeless    General Information Comments SW noted consult re: pt is homeless, pregnant, and with OUD. SAIMA confirmed with Yariel @ [facility] that [facility] must be involved for pt to enroll in services there. SAIMA attempted to meet with pt in ED but pt JAVAN.             Tawnya Infante, BELLE, LSW  Medical Social Worker  Ph [phone]  Fax [phone]  [email or web address]
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
## 2025-03-03 NOTE — H&P
[facility] Medicine Services  History & Physical    Patient Name: Jordana Herzog  : 2000  MRN: [id number]  Primary Care Physician:  Nupur Willis APRN  Date of admission: 3/2/2025  Date and Time of Service: 3/2/2025 at 4:15 am    Subjective      Chief Complaint: Generalized weakness, Fatigue    History of Present Illness: Jordana Herzog is a 24 y.o. female with no past remarkable medical history, currently 18 to 20 weeks pregnant, who presented to [facility] on 3/2/2025 with generalized weakness and fatigue.    Patient is currently awake alert oriented 3, conversational, reports she is homeless at this time.  Patient reports drug use, feeling fatigued, low blood pressure which prompted her to come to hospital for further evaluation.  Patient patient denies any chest pain shortness of breath, did report palpitation, denies any fever chills or dysuria or any recent falls.  Patient states she has been evaluated by OB/GYN last week outpatient, had no issues at the time.  She denies any vaginal bleeding at this time.    In ED, soft BP, afebrile, EKG sinus bradycardia heart rate 51, normal, CMP showing bicarb of 20, WBC 13, APAP and salicylate levels normal, UA negative for bacteria, chlamydia negative, blood cultures, toxicology positive for THC and opioids, x-ray is negative for acute cardiopulmonary findings.  In the ED patient was slightly lethargic, received Narcan of 2 mg which improved her mental status.      Review of Systems negative unless mentioned above    Personal History     Past Medical History:   Diagnosis Date    Acid reflux     Allergic     Anxiety     Bipolar disorder     Depression     Sinusitis     Strep throat        Past Surgical History:   Procedure Laterality Date    ADENOIDECTOMY      TONSILLECTOMY      WISDOM TOOTH EXTRACTION         Family History: family history includes ADD / ADHD in her father and mother; Anemia in her mother and sister; Anxiety disorder in  her father and mother; Arthritis in her mother; Bipolar disorder in her mother; Depression in her father and mother; Heart murmur in her sister; Obesity in her paternal uncle. She was adopted. Otherwise pertinent FHx was reviewed and not pertinent to current issue.    Social History:  reports that she has quit smoking. Her smoking use included electronic cigarette. She started smoking about 5 years ago. She has been exposed to tobacco smoke. She has quit using smokeless tobacco. Drug use questions deferred to the physician. She reports that she does not drink alcohol.    Home Medications:  Prior to Admission Medications       Prescriptions Last Dose Informant Patient Reported? Taking?    amoxicillin-clavulanate (AUGMENTIN) 875-125 MG per tablet   No No    Take 1 tablet by mouth 2 (Two) Times a Day.    azithromycin (Zithromax Z-Ruddy) 250 MG tablet   No No    Take 2 tablets the first day, then 1 tablet daily for 4 days.    ondansetron ODT (Zofran ODT) 4 MG disintegrating tablet   No No    Place 1 tablet under the tongue Every 8 (Eight) Hours As Needed for Nausea.    oxymetazoline (AFRIN) 0.05 % nasal spray   No No    2 sprays into the nostril(s) as directed by provider 2 (Two) Times a Day.              Allergies:  Allergies   Allergen Reactions    Peanut (Diagnostic) Hives, Shortness Of Breath and Swelling       Objective      Vitals:   Temp:  [96.6 °F (35.9 °C)] 96.6 °F (35.9 °C)  Heart Rate:  [56-97] 93  Resp:  [10-19] 16  BP: (78-99)/(37-70) 94/70  Body mass index is 24.48 kg/m².  Physical Exam  General: Awake alert [place] x 3  HEENT: Atraumatic normocephalic  Respiratory: Clear to auscultation  Abdomen: Soft, nontender, no rebound or guarding  Cardiac: Heart rate normal, rhythm regular  Extremities: No remarkable edema on the bilateral extremities  Neuro: Awake alert [place] x 3, moves all extremities    Diagnostic Data:  Lab Results (last 24 hours)       Procedure Component Value Units Date/Time    Chlamydia  trachomatis, Neisseria gonorrhoeae, PCR - Swab, Urine, Random Void [[id number]]  (Normal) Collected: 03/03/25 0038    Specimen: Swab from Urine, Random Void Updated: 03/03/25 0228     Chlamydia DNA by PCR Not Detected     Neisseria gonorrhoeae by PCR Not Detected    High Sensitivity Troponin T 1Hr [[id number]] Collected: 03/03/25 0036    Specimen: Blood Updated: 03/03/25 0107     HS Troponin T <6 ng/L      Troponin T Numeric Delta --     Comment: Unable to calculate.       Narrative:      High Sensitive Troponin T Reference Range:  <14.0 ng/L- Negative Female for AMI  <22.0 ng/L- Negative Male for AMI  >=14 - Abnormal Female indicating possible myocardial injury.  >=22 - Abnormal Male indicating possible myocardial injury.   Clinicians would have to utilize clinical acumen, EKG, Troponin, and serial changes to determine if it is an Acute Myocardial Infarction or myocardial injury due to an underlying chronic condition.         Urine Drug Screen - Urine, Clean Catch [[id number]]  (Abnormal) Collected: 03/03/25 0037    Specimen: Urine, Clean Catch Updated: 03/03/25 0057     THC, Screen, Urine Positive     Phencyclidine (PCP), Urine Negative     Cocaine Screen, Urine Negative     Methamphetamine, Ur Negative     Opiate Screen Positive     Amphetamine Screen, Urine Negative     Benzodiazepine Screen, Urine Negative     Tricyclic Antidepressants Screen Negative     Methadone Screen, Urine Negative     Barbiturates Screen, Urine Negative     Oxycodone Screen, Urine Negative     Buprenorphine, Screen, Urine Negative    Narrative:      Cutoff For Drugs Screened:    Amphetamines               500 ng/ml  Barbiturates               200 ng/ml  Benzodiazepines            150 ng/ml  Cocaine                    150 ng/ml  Methadone                  200 ng/ml  Opiates                    100 ng/ml  Phencyclidine               25 ng/ml  THC                         50 ng/ml  Methamphetamine            500 ng/ml  Tricyclic Antidepressants   300 ng/ml  Oxycodone                  100 ng/ml  Buprenorphine               10 ng/ml    The normal value for all drugs tested is negative. This report includes unconfirmed screening results, with the cutoff values listed, to be used for medical treatment purposes only.  Unconfirmed results must not be used for non-medical purposes such as employment or legal testing.  Clinical consideration should be applied to any drug of abuse test, particularly when unconfirmed results are used.    All urine drugs of abuse requests without chain of custody are for medical screening purposes only.  False positives are possible.      Urinalysis With Microscopic If Indicated (No Culture) - Urine, Clean Catch [[id number]]  (Abnormal) Collected: 03/03/25 0037    Specimen: Urine, Clean Catch Updated: 03/03/25 0046     Color, UA Yellow     Appearance, UA Cloudy     pH, UA <=5.0     Specific Gravity, UA 1.013     Glucose, UA Negative     Ketones, UA Trace     Bilirubin, UA Negative     Blood, UA Negative     Protein, UA Trace     Leuk Esterase, UA Negative     Nitrite, UA Negative     Urobilinogen, UA 0.2 E.U./dL    Narrative:      Urine microscopic not indicated.    Blood Gas, Venous - [[id number]]  (Abnormal) Collected: 03/02/25 2335    Specimen: Venous Blood Updated: 03/03/25 0004     Site Right Brachial     pH, Venous 7.253 pH Units      pCO2, Venous 45.1 mm Hg      pO2, Venous 26.4 mm Hg      HCO3, Venous 19.9 mmol/L      Base Excess, Venous -7.1 mmol/L      Comment: Serial Number: [id number]:  [id number]        O2 Saturation, Venous 39.5 %      CO2 Content 21.3 mmol/L      Barometric Pressure for Blood Gas --     Comment: N/A        Modality NRB     FIO2 100 %     Respiratory Panel PCR w/COVID-19(SARS-CoV-2) LYNDSEY/GLADYS/GRISELDA/PAD/COR/TARI In-House, NP Swab in UTM/VTM, 2 HR TAT - Swab, Nasopharynx [[id number]]  (Normal) Collected: 03/02/25 2237    Specimen: Swab from Nasopharynx Updated: 03/02/25 2327     ADENOVIRUS, PCR Not Detected      Coronavirus 229E Not Detected     Coronavirus HKU1 Not Detected     Coronavirus NL63 Not Detected     Coronavirus OC43 Not Detected     COVID19 Not Detected     Human Metapneumovirus Not Detected     Human Rhinovirus/Enterovirus Not Detected     Influenza A PCR Not Detected     Influenza B PCR Not Detected     Parainfluenza Virus 1 Not Detected     Parainfluenza Virus 2 Not Detected     Parainfluenza Virus 3 Not Detected     Parainfluenza Virus 4 Not Detected     RSV, PCR Not Detected     Bordetella pertussis pcr Not Detected     Bordetella parapertussis PCR Not Detected     Chlamydophila pneumoniae PCR Not Detected     Mycoplasma pneumo by PCR Not Detected    Narrative:      In the setting of a positive respiratory panel with a viral infection PLUS a negative procalcitonin without other underlying concern for bacterial infection, consider observing off antibiotics or discontinuation of antibiotics and continue supportive care. If the respiratory panel is positive for atypical bacterial infection (Bordetella pertussis, Chlamydophila pneumoniae, or Mycoplasma pneumoniae), consider antibiotic de-escalation to target atypical bacterial infection.    D-dimer, Quantitative [[id number]]  (Normal) Collected: 03/02/25 2235    Specimen: Blood from Arm, Right Updated: 03/02/25 2326     D-Dimer, Quantitative 0.49 MCGFEU/mL     Narrative:      According to the assay 's published package insert, a normal (<0.50 MCGFEU/mL) D-dimer result in conjunction with a non-high clinical probability assessment, excludes deep vein thrombosis (DVT) and pulmonary embolism (PE) with high sensitivity.    D-dimer values increase with age and this can make VTE exclusion of an older population difficult. To address this, the [employer], based on best available evidence and recent guidelines, recommends that clinicians use age-adjusted D-dimer thresholds in patients greater than 50 years of age with: a) a low  "probability of PE who do not meet all Pulmonary Embolism Rule Out Criteria, or b) in those with intermediate probability of PE.   The formula for an age-adjusted D-dimer cut-off is \"age/100\".  For example, a 60 year old patient would have an age-adjusted cut-off of 0.60 MCGFEU/mL and an 80 year old 0.80 MCGFEU/mL.    Comprehensive Metabolic Panel [[id number]]  (Abnormal) Collected: 03/02/25 2235    Specimen: Blood from Arm, Right Updated: 03/02/25 2312     Glucose 143 mg/dL      BUN 9 mg/dL      Creatinine 0.68 mg/dL      Sodium 137 mmol/L      Potassium 3.8 mmol/L      Chloride 106 mmol/L      CO2 20.4 mmol/L      Calcium 9.3 mg/dL      Total Protein 6.4 g/dL      Albumin 3.9 g/dL      ALT (SGPT) 9 U/L      AST (SGOT) 13 U/L      Alkaline Phosphatase 44 U/L      Total Bilirubin 0.3 mg/dL      Globulin 2.5 gm/dL      A/G Ratio 1.6 g/dL      BUN/Creatinine Ratio 13.2     Anion Gap 10.6 mmol/L      eGFR 126.5 mL/min/1.73     Narrative:      GFR Categories in Chronic Kidney Disease (CKD)      GFR Category          GFR (mL/min/1.73)    Interpretation  G1                     90 or greater         Normal or high (1)  G2                      60-89                Mild decrease (1)  G3a                   45-59                Mild to moderate decrease  G3b                   30-44                Moderate to severe decrease  G4                    15-29                Severe decrease  G5                    14 or less           Kidney failure          (1)In the absence of evidence of kidney disease, neither GFR category G1 or G2 fulfill the criteria for CKD.    eGFR calculation 2021 CKD-EPI creatinine equation, which does not include race as a factor    Acetaminophen Level [[id number]]  (Normal) Collected: 03/02/25 2235    Specimen: Blood from Arm, Right Updated: 03/02/25 2312     Acetaminophen <5.0 mcg/mL     Narrative:      Acetaminophen Therapeutic Range  5-20 ug/mL      Hours after ingestion            Toxic Value    4 Hours    "                        150 ug/mL    8 Hours                            70 ug/mL   12 Hours                            40 ug/mL   16 Hours                            20 ug/mL    These values apply to a single ingestion only.     Ethanol [[id number]] Collected: 03/02/25 2235    Specimen: Blood from Arm, Right Updated: 03/02/25 2312     Ethanol % <0.010 %     Narrative:      Plasma Ethanol Clinical Symptoms:    ETOH (%)               Clinical Symptom  .01-.05              No apparent influence  .03-.12              Euphoria, Diminished judgment and attention   .09-.25              Impaired comprehension, Muscle incoordination  .18-.30              Confusion, Staggered gait, Slurred speech  .25-.40              Markedly decreased response to stimuli, unable to stand or                        walk, vomitting, sleep or stupor  .35-.50              Comatose, Anesthesia, Subnormal body temperature        Salicylate Level [[id number]]  (Normal) Collected: 03/02/25 2235    Specimen: Blood from Arm, Right Updated: 03/02/25 2312     Salicylate <0.5 mg/dL     High Sensitivity Troponin T [[id number]]  (Normal) Collected: 03/02/25 2235    Specimen: Blood from Arm, Right Updated: 03/02/25 2312     HS Troponin T <6 ng/L     Narrative:      High Sensitive Troponin T Reference Range:  <14.0 ng/L- Negative Female for AMI  <22.0 ng/L- Negative Male for AMI  >=14 - Abnormal Female indicating possible myocardial injury.  >=22 - Abnormal Male indicating possible myocardial injury.   Clinicians would have to utilize clinical acumen, EKG, Troponin, and serial changes to determine if it is an Acute Myocardial Infarction or myocardial injury due to an underlying chronic condition.         BNP [[id number]]  (Normal) Collected: 03/02/25 2235    Specimen: Blood from Arm, Right Updated: 03/02/25 2312     proBNP 57.2 pg/mL     Narrative:      This assay is used as an aid in the diagnosis of individuals suspected of having heart failure. It can be used  as an aid in the diagnosis of acute decompensated heart failure (ADHF) in patients presenting with signs and symptoms of ADHF to the emergency department (ED). In addition, NT-proBNP of <300 pg/mL indicates ADHF is not likely.    Age Range Result Interpretation  NT-proBNP Concentration (pg/mL:      <50             Positive            >450                   Gray                 300-450                    Negative             <300    50-75           Positive            >900                  Gray                300-900                  Negative            <300      >75             Positive            >1800                  Gray                300-1800                  Negative            <300    Blood Culture - Blood, Arm, Left [[id number]] Collected: 03/02/25 2252    Specimen: Blood from Arm, Left Updated: 03/02/25 2305    [place] Draw [[id number]] Collected: 03/02/25 2235    Specimen: Blood from Arm, Right Updated: 03/02/25 2246    Narrative:      The following orders were created for panel order [place] Draw.  Procedure                               Abnormality         Status                     ---------                               -----------         ------                     Green Top (Gel)[[id number]]                                  Final result               Lavender Top[[id number]]                                     Final result               Gold Top - SST[[id number]]                                   Final result               Light Blue Top[[id number]]                                   Final result                 Please view results for these tests on the individual orders.    Green Top (Gel) [[id number]] Collected: 03/02/25 2235    Specimen: Blood from Arm, Right Updated: 03/02/25 2246     Extra Tube Hold for add-ons.     Comment: Auto resulted.       Lavender Top [[id number]] Collected: 03/02/25 2235    Specimen: Blood from Arm, Right Updated: 03/02/25 2246     Extra Tube hold for add-on     Comment: Auto  resulted       Gold Top - SST [[id number]] Collected: 03/02/25 2235    Specimen: Blood from Arm, Right Updated: 03/02/25 2246     Extra Tube Hold for add-ons.     Comment: Auto resulted.       Light Blue Top [[id number]] Collected: 03/02/25 2235    Specimen: Blood from Arm, Right Updated: 03/02/25 2246     Extra Tube Hold for add-ons.     Comment: Auto resulted       POC Lactate [[id number]]  (Normal) Collected: 03/02/25 2240    Specimen: Blood Updated: 03/02/25 2243     Lactate 0.5 mmol/L      Comment: Serial Number: [id number]:  [id number]       CBC & Differential [[id number]]  (Abnormal) Collected: 03/02/25 2235    Specimen: Blood from Arm, Right Updated: 03/02/25 2242    Narrative:      The following orders were created for panel order CBC & Differential.  Procedure                               Abnormality         Status                     ---------                               -----------         ------                     CBC Auto Differential[[id number]]        Abnormal            Final result                 Please view results for these tests on the individual orders.    CBC Auto Differential [[id number]]  (Abnormal) Collected: 03/02/25 2235    Specimen: Blood from Arm, Right Updated: 03/02/25 2242     WBC 13.11 10*3/mm3      RBC 3.81 10*6/mm3      Hemoglobin 11.9 g/dL      Hematocrit 35.5 %      MCV 93.2 fL      MCH 31.2 pg      MCHC 33.5 g/dL      RDW 13.7 %      RDW-SD 47.1 fl      MPV 10.6 fL      Platelets 225 10*3/mm3      Neutrophil % 78.4 %      Lymphocyte % 15.3 %      Monocyte % 5.3 %      Eosinophil % 0.2 %      Basophil % 0.2 %      Immature Grans % 0.6 %      Neutrophils, Absolute 10.28 10*3/mm3      Lymphocytes, Absolute 2.00 10*3/mm3      Monocytes, Absolute 0.69 10*3/mm3      Eosinophils, Absolute 0.03 10*3/mm3      Basophils, Absolute 0.03 10*3/mm3      Immature Grans, Absolute 0.08 10*3/mm3      nRBC 0.0 /100 WBC     Blood Culture - Blood, Arm, Right [[id number]] Collected: 03/02/25 2235     Specimen: Blood from Arm, Right Updated: 03/02/25 2240    POC Glucose Once [[id number]]  (Abnormal) Collected: 03/02/25 2150    Specimen: Blood Updated: 03/02/25 2152     Glucose 109 mg/dL      Comment: Serial Number: [id number]:  [id number]                Imaging Results (Last 24 Hours)       Procedure Component Value Units Date/Time    XR Chest 1 View [[id number]] Collected: 03/03/25 0032     Updated: 03/03/25 0034    Narrative:      XR CHEST 1 VW    Date of Exam: 3/3/2025 12:06 AM EST    Indication: hypoxic    Comparison: 3/27/2005.    Findings:  There are no airspace consolidations. No pleural fluid. No pneumothorax. The pulmonary vasculature appears within normal limits. The cardiac and mediastinal silhouette appear unremarkable. No acute osseous abnormality identified.      Impression:      Impression:  No acute cardiopulmonary process.    Electronically Signed: Olimpia Serna MD    3/3/2025 12:32 AM EST    Workstation ID: [id number]              Assessment & Plan    Jordana Herzog is a 24 y.o. female with no past remarkable medical history, currently 18 to 20 weeks pregnant, who presented to [facility] on 3/2/2025 with generalized weakness and fatigue.    Assessments  Polysubstance use  Hypotension improved with fluids  Reported pregnancy, 18 to 20 weeks    Plan  -Patient received IV bolus fluids, continue normal saline 125 cc an hour for now,  -will consult OB/GYN for further evaluation given patient is pregnant before discharge  -Will need case management and  consult for disposition given patient is homeless and pregnant  -Counseled on substance use cessation patient patient is pregnant  -Follow a.m. labs      VTE Prophylaxis:  No VTE prophylaxis order currently exists.       I discussed the patient's findings and my recommendations with patient.    This document has been electronically signed by Pankaj Marie MD on March 3, 2025 04:15 EST   [facility] Hospitalist Team

## 2025-03-04 ENCOUNTER — READMISSION MANAGEMENT (OUTPATIENT)
Dept: CALL CENTER | Facility: HOSPITAL | Age: 25
End: 2025-03-04
Payer: MEDICAID

## 2025-03-04 VITALS
HEIGHT: 62 IN | BODY MASS INDEX: 25.62 KG/M2 | OXYGEN SATURATION: 100 % | RESPIRATION RATE: 16 BRPM | WEIGHT: 139.25 LBS | SYSTOLIC BLOOD PRESSURE: 125 MMHG | DIASTOLIC BLOOD PRESSURE: 75 MMHG | TEMPERATURE: 98 F | HEART RATE: 82 BPM

## 2025-03-04 PROCEDURE — G0378 HOSPITAL OBSERVATION PER HR: HCPCS

## 2025-03-04 PROCEDURE — 96361 HYDRATE IV INFUSION ADD-ON: CPT

## 2025-03-04 RX ADMIN — PRENATAL VITAMINS-IRON FUMARATE 27 MG IRON-FOLIC ACID 0.8 MG TABLET 1 TABLET: at 08:52

## 2025-03-04 RX ADMIN — Medication 10 ML: at 08:53

## 2025-03-04 NOTE — PLAN OF CARE
Goal Outcome Evaluation:            Pt okay to discharge per MD order. Education reviewed with pt and her boyfriend. Emphasized importance of following up with primary care provider and OB provider. Appt reminders provided to patient. Pt verbalized understanding.

## 2025-03-04 NOTE — DISCHARGE SUMMARY
"[facility] Medicine Services  Discharge Summary    Date of Service: 3/4/2025  Patient Name: Jordana Herzog  : 2000  MRN: [id number]    Date of Admission: 3/2/2025  Discharge Diagnosis: Generalized weakness  Date of Discharge: 3/4/2025  Primary Care Physician: Nupur Willis APRN      Presenting Problem:   Generalized weakness [R53.1]  Hypotension, unspecified hypotension type [I95.9]  Pregnancy, unspecified gestational age [Z34.90]  Drug overdose of undetermined intent, initial encounter [T50.224A]    Active and Resolved Hospital Problems:  Active Hospital Problems    Diagnosis POA    **Generalized weakness [R53.1] Yes      Resolved Hospital Problems   No resolved problems to display.         Hospital Course     HPI:    \"Jordana Herzog is a 24 y.o. female with no past remarkable medical history, currently 18 to 20 weeks pregnant, who presented to [facility] on 3/2/2025 with generalized weakness and fatigue.     Patient is currently awake alert oriented 3, conversational, reports she is homeless at this time.  Patient reports drug use, feeling fatigued, low blood pressure which prompted her to come to hospital for further evaluation.  Patient patient denies any chest pain shortness of breath, did report palpitation, denies any fever chills or dysuria or any recent falls.  Patient states she has been evaluated by OB/GYN last week outpatient, had no issues at the time.  She denies any vaginal bleeding at this time.     In ED, soft BP, afebrile, EKG sinus bradycardia heart rate 51, normal, CMP showing bicarb of 20, WBC 13, APAP and salicylate levels normal, UA negative for bacteria, chlamydia negative, blood cultures, toxicology positive for THC and opioids, x-ray is negative for acute cardiopulmonary findings.  In the ED patient was slightly lethargic, received Narcan of 2 mg which improved her mental status.\"    Hospital Course:    Patient is a 24-year-old female currently 18 " to 20 weeks pregnant who presented to the hospital with generalized weakness and fatigue.  Had transient hypotension and hypoxia responded to Narcan in the ED.  Received some IV fluid afterwards and improved very rapidly.  OB/GYN met with the patient to discuss her pregnancy and the risks of polysubstance abuse in pregnancy.  Considerable effort and time during this admission was spent to get patient involved with resources in the community.  She met extensively with case management and social work to discuss various inpatient opportunities for her.  She preferred to discharge without inpatient treatment.  After a period of medical stability and discussion of the options for postdischarge care she was set up with a PCP appointment, encouragement for sobriety from fentanyl, resources for outpatient drug use, and a prescription for Narcan.        DISCHARGE Follow Up Recommendations for labs and diagnostics:     PCP, OB/GYN, drug use resources given to you        Day of Discharge     Vital Signs:  Temp:  [97.6 °F (36.4 °C)-98 °F (36.7 °C)] 98 °F (36.7 °C)  Heart Rate:  [68-99] 82  Resp:  [14-18] 16  BP: ()/(59-75) 125/75    Physical Exam:  Physical Exam  Constitutional:       Appearance: Normal appearance.   Cardiovascular:      Rate and Rhythm: Normal rate and regular rhythm.      Pulses: Normal pulses.      Heart sounds: Normal heart sounds.   Pulmonary:      Effort: Pulmonary effort is normal.      Breath sounds: Normal breath sounds.   Abdominal:      General: Abdomen is flat.   Neurological:      Mental Status: She is alert.            Pertinent  and/or Most Recent Results     LAB RESULTS:      Lab 03/03/25  0635 03/02/25  2240 03/02/25  2235   WBC 8.72  --  13.11*   HEMOGLOBIN 9.7*  --  11.9*   HEMATOCRIT 29.8*  --  35.5   PLATELETS 216  --  225   NEUTROS ABS 6.20  --  10.28*   IMMATURE GRANS (ABS) 0.04  --  0.08*   LYMPHS ABS 1.72  --  2.00   MONOS ABS 0.67  --  0.69   EOS ABS 0.07  --  0.03   MCV 94.3  --   93.2   LACTATE  --  0.5  --    D DIMER QUANT  --   --  0.49         Lab 03/03/25  0635 03/02/25 2235   SODIUM 136 137   POTASSIUM 3.7 3.8   CHLORIDE 104 106   CO2 22.0 20.4*   ANION GAP 10.0 10.6   BUN 8 9   CREATININE 0.66 0.68   EGFR 127.4 126.5   GLUCOSE 78 143*   CALCIUM 8.4* 9.3   MAGNESIUM 1.8  --    PHOSPHORUS 3.4  --          Lab 03/03/25  0635 03/02/25 2235   TOTAL PROTEIN 5.4* 6.4   ALBUMIN 3.2* 3.9   GLOBULIN 2.2 2.5   ALT (SGPT) <5 9   AST (SGOT) 11 13   BILIRUBIN 0.2 0.3   ALK PHOS 37* 44         Lab 03/03/25  0036 03/02/25 2235   PROBNP  --  57.2   HSTROP T <6 <6                 Lab 03/02/25  2335   FIO2 100     Brief Urine Lab Results  (Last result in the past 365 days)        Color   Clarity   Blood   Leuk Est   Nitrite   Protein   CREAT   Urine HCG        03/03/25 0037               Positive       03/03/25 0037 Yellow   Cloudy   Negative   Negative   Negative   Trace                 Microbiology Results (last 10 days)       Procedure Component Value - Date/Time    Chlamydia trachomatis, Neisseria gonorrhoeae, PCR - Swab, Urine, Random Void [[id number]]  (Normal) Collected: 03/03/25 0038    Lab Status: Final result Specimen: Swab from Urine, Random Void Updated: 03/03/25 0228     Chlamydia DNA by PCR Not Detected     Neisseria gonorrhoeae by PCR Not Detected    Blood Culture - Blood, Arm, Left [[id number]]  (Normal) Collected: 03/02/25 2252    Lab Status: Preliminary result Specimen: Blood from Arm, Left Updated: 03/03/25 2315     Blood Culture No growth at 24 hours    Narrative:      Less than seven (7) mL's of blood was collected.  Insufficient quantity may yield false negative results.    Respiratory Panel PCR w/COVID-19(SARS-CoV-2) [facility]I In-House, NP Swab in UTM/VTM, 2 HR TAT - Swab, Nasopharynx [[id number]]  (Normal) Collected: 03/02/25 2237    Lab Status: Final result Specimen: Swab from Nasopharynx Updated: 03/02/25 2232     ADENOVIRUS, PCR Not Detected     Coronavirus 229E  Not Detected     Coronavirus HKU1 Not Detected     Coronavirus NL63 Not Detected     Coronavirus OC43 Not Detected     COVID19 Not Detected     Human Metapneumovirus Not Detected     Human Rhinovirus/Enterovirus Not Detected     Influenza A PCR Not Detected     Influenza B PCR Not Detected     Parainfluenza Virus 1 Not Detected     Parainfluenza Virus 2 Not Detected     Parainfluenza Virus 3 Not Detected     Parainfluenza Virus 4 Not Detected     RSV, PCR Not Detected     Bordetella pertussis pcr Not Detected     Bordetella parapertussis PCR Not Detected     Chlamydophila pneumoniae PCR Not Detected     Mycoplasma pneumo by PCR Not Detected    Narrative:      In the setting of a positive respiratory panel with a viral infection PLUS a negative procalcitonin without other underlying concern for bacterial infection, consider observing off antibiotics or discontinuation of antibiotics and continue supportive care. If the respiratory panel is positive for atypical bacterial infection (Bordetella pertussis, Chlamydophila pneumoniae, or Mycoplasma pneumoniae), consider antibiotic de-escalation to target atypical bacterial infection.    Blood Culture - Blood, Arm, Right [[id number]]  (Normal) Collected: 03/02/25 2235    Lab Status: Preliminary result Specimen: Blood from Arm, Right Updated: 03/03/25 2246     Blood Culture No growth at 24 hours            US Ob Limited 1 + Fetuses    Result Date: 3/3/2025  Impression: Impression: Anterior placenta location without evidence of previa. Single viable IUP currently in breech presentation. Fetal heart rate 160 bpm. Electronically Signed: Yumiko Dunn MD  3/3/2025 2:20 PM EST  Workstation ID: [id number]    US Ob Transvaginal    Result Date: 3/3/2025  Impression: Impression: Anterior placenta location without evidence of previa. Single viable IUP currently in breech presentation. Fetal heart rate 160 bpm. Electronically Signed: Yumiko Dunn MD  3/3/2025 2:20 PM EST  Workstation  ID: [id number]    XR Chest 1 View    Result Date: 3/3/2025  Impression: Impression: No acute cardiopulmonary process. Electronically Signed: Olimpia Serna MD  3/3/2025 12:32 AM EST  Workstation ID: [id number]    US Ob Limited 1 + Fetuses    Result Date: 2/7/2025  Impression: Small fetal colloid cysts of unknown clinical significance. Otherwise normal appearance of the uterus. Fetal heart rate is normal. Electronically Signed: Krishna Han MD  2/7/2025 7:35 PM EST  Workstation ID: [id number]                 Labs Pending at Discharge:  Pending Results       None            Procedures Performed           Consults:   Consults       Date and Time Order Name Status Description    3/3/2025  5:12 AM Inpatient Obstetrics / Gynecology Consult                Discharge Details        Discharge Medications        New Medications        Instructions Start Date   naloxone 4 MG/0.1ML nasal spray  Commonly known as: NARCAN   Call 911. Don't prime. [place] in 1 nostril for overdose. Repeat in 2-3 minutes in other nostril if no or minimal breathing/responsiveness.             Continue These Medications        Instructions Start Date   miconazole 2 % cream  Commonly known as: MICOTIN   1 Application, 2 Times Daily      prenatal vitamin 27-0.8 27-0.8 MG tablet tablet   1 tablet, Daily               Allergies   Allergen Reactions    Nuts Anaphylaxis     All nuts    Peanut (Diagnostic) Hives, Shortness Of Breath and Swelling         Discharge Disposition:   Home or Self Care    Diet:  Hospital:  Diet Order   Procedures    Diet: Regular/House; Fluid Consistency: Thin (IDDSI 0)         Discharge Activity:         CODE STATUS:  Code Status and Medical Interventions: CPR (Attempt to Resuscitate); Full   Ordered at: 03/03/25 2815     Code Status (Patient has no pulse and is not breathing):    CPR (Attempt to Resuscitate)     Medical Interventions (Patient has pulse or is breathing):    Full         Future Appointments   Date Time Provider Department  [place]   3/6/2025  3:30 PM Mary Brice PA-C [facility]           Time spent on Discharge including face to face service:  44 minutes    Signature: Electronically signed by Edmar Locke MD, 03/04/25, 14:29 EST.  [facility] Hospitalist Team
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
## 2025-03-04 NOTE — DISCHARGE SUMMARY
Discharge Planning Assessment   [facility]     Patient Name: Jordana Herzog  MRN: [id number]  Today's Date: 3/4/2025    Admit Date: 3/2/2025    Plan: DC back to the community.   Discharge Needs Assessment       Row Name 03/04/25 0947       Living Environment    People in Home significant other    Name(s) of People in Home BRIANDanyJuma    Current Living Arrangements homeless    Potentially Unsafe Housing Conditions other (see comments)  Living in her car    In the past 12 months has the electric, gas, oil, or water company threatened to shut off services in your home? Yes    Primary Care Provided by self    Provides Primary Care For no one    Family Caregiver if Needed significant other    Family Caregiver Names BRIANSuhail    Quality of Family Relationships helpful;involved;supportive    Able to Return to Prior Arrangements yes       Resource/Environmental Concerns    Resource/Environmental Concerns financial;environmental    Environment Concerns other (see comments)  Homeless    Financial Concerns food, unable to afford;medicine, unable to afford;rent or mortgage, unable to afford;unemployed    Transportation Concerns none       Transportation Needs    In the past 12 months, has lack of transportation kept you from medical appointments or from getting medications? yes    In the past 12 months, has lack of transportation kept you from meetings, work, or from getting things needed for daily living? Yes       Food Insecurity    Within the past 12 months, you worried that your food would run out before you got the money to buy more. Often true    Within the past 12 months, the food you bought just didn't last and you didn't have money to get more. Often true       Transition Planning    Patient/Family Anticipates Transition to home    Patient/Family Anticipated Services at Transition     Transportation Anticipated car, drives self       Discharge Needs Assessment    Readmission Within the Last 30 Days no previous  admission in last 30 days    Equipment Currently Used at Home none    Concerns to be Addressed discharge planning    Do you want help finding or keeping work or a job? I do not need or want help    Do you want help with school or training? For example, starting or completing job training or getting a high school diploma, GED or equivalent No    Anticipated Changes Related to Illness none    Equipment Needed After Discharge none                   Discharge Plan       Row Name 03/04/25 0949       Plan    Plan DC back to the community.    Patient/Family in Agreement with Plan yes    Plan Comments CM went to the bedside and spoke with the patient. Patient is currently living in her car with her SO, Juma. Patient does drive and she will transport herself back to her car. Patient states that she does need to be set up with a PCP, CM called the patient connection hub and set her up with an appointment this Thursday, 3/6/25 @ 3:30p, CM sent secure chat to floor RN to make sure she tells the patient on her AVS appointment with PCP has been made. Patient wishes to be enrolled in the MTB program, CM updated this in the chart. This CM provided the patient with the [place] packet and the [facility] Resource packet. Please refer to SW note for drug abuse and homelessness f/u.                  Continued Care and Services - Admitted Since 3/2/2025    No active coordination exists for this encounter.          Demographic Summary       Row Name 03/04/25 0949       General Information    Admission Type observation    Arrived From emergency department    Referral Source admission list    Reason for Consult substance use concerns;abuse/neglect concerns    General Information Comments LSW received S/C from RNCM re: concerns for the need to reports opiate use to [facility] due to Pt being pregnant. LSW contacted [facility] who confirms no report warranted as there is not a child to report abuse on. No further needs identified.       Contact  Information    Permission Granted to Share Info With       Row Name 03/04/25 0947       General Information    Admission Type observation    Arrived From emergency department    Referral Source admission list    Reason for Consult discharge planning    Preferred Language English       Contact Information    Permission Granted to Share Info With                    Functional Status       Row Name 03/04/25 0947       Functional Status    Usual Activity Tolerance good    Current Activity Tolerance good       Functional Status, IADL    Medications independent    Meal Preparation independent    Housekeeping independent    Laundry independent    Shopping independent                 Met with patient in room wearing PPE: mask.    Maintained distance greater than six feet and spent less than 15 minutes in the room.       Janny Velasquez RN

## 2025-03-04 NOTE — SIGNIFICANT NOTE
03/04/25 0933   Living Situation   Current Living Arrangements homeless  (Living in her car.)   Potentially Unsafe Housing Conditions other (see comments)  (Living in her car.)   Food Insecurity   Within the past 12 months, you worried that your food would run out before you got the money to buy more. Often true   Within the past 12 months, the food you bought just didn't last and you didn't have money to get more. Often true   Transportation Needs   In the past 12 months, has lack of transportation kept you from medical appointments or from getting medications? yes  (Has reliable transportation right now. CM showed patient what number to call on the back on her Medicaid card in case she needs transportationt to MD appointments.)   In the past 12 months, has lack of transportation kept you from meetings, work, or from getting things needed for daily living? Yes   Utilities   In the past 12 months has the electric, gas, oil, or water company threatened to shut off services in your home?   (Patient homeless.)   Abuse Screen (yes response referral indicated)   Feels Unsafe at Home or Work/School no   Feels Threatened by Someone no   Does Anyone Try to Keep You From Having Contact with Others or Doing Things Outside Your Home? no   Physical Signs of Abuse Present no   Financial Resource Strain   How hard is it for you to pay for the very basics like food, housing, medical care, and heating? Hard   Employment   Do you want help finding or keeping work or a job? I do not need or want help   Family and Community Support   If for any reason you need help with day-to-day activities such as bathing, preparing meals, shopping, managing finances, etc., do you get the help you need? I don't need any help   How often do you feel lonely or isolated from those around you? Often   Education   Preferred Language English   Physical Activity   On average, how many days per week do you engage in moderate to strenuous exercise (like a  brisk walk)? 3 days   On average, how many minutes do you engage in exercise at this level? 60 min   Number of minutes of exercise per week 180   Alcohol Use   Q1: How often do you have a drink containing alcohol? Never   Q2: How many drinks containing alcohol do you have on a typical day when you are drinking? None   Q3: How often do you have six or more drinks on one occasion? Never   Stress   Do you feel stress - tense, restless, nervous, or anxious, or unable to sleep at night because your mind is troubled all the time - these days? Rather much   Mental Health   Little interest or pleasure in doing things Over half   Feeling down, depressed, or hopeless Over half   Disabilities   Difficulty Concentrating, Remembering or Making Decisions no   Difficulty Managing Errands Independently no

## 2025-03-04 NOTE — CASE MANAGEMENT/SOCIAL WORK
Social Work Assessment  [facility]     Patient Name: Jordana Herzog  MRN: [id number]  Today's Date: 3/4/2025    Admit Date: 3/2/2025     Demographic Summary       Row Name 03/04/25 0949       General Information    Admission Type observation    Arrived From emergency department    Referral Source admission list    Reason for Consult substance use concerns;abuse/neglect concerns    General Information Comments LSW received S/C from RNCM re: concerns for the need to reports opiate use to [facility] due to Pt being pregnant. LSW contacted [facility] who confirms no report warranted as there is not a child to report abuse on. No further needs identified.       Contact Information    Permission Granted to Share Info With              Sada Linda, JAZMINE  Medical Social Worker

## 2025-03-04 NOTE — CASE MANAGEMENT/SOCIAL WORK
Discharge Planning Assessment   [facility]     Patient Name: Jordana Herzog  MRN: [id number]  Today's Date: 3/4/2025    Admit Date: 3/2/2025    Plan: DC back to the community.   Discharge Needs Assessment       Row Name 03/04/25 0947       Living Environment    People in Home significant other    Name(s) of People in Home BRIANDanyJuma    Current Living Arrangements homeless    Potentially Unsafe Housing Conditions other (see comments)  Living in her car    In the past 12 months has the electric, gas, oil, or water company threatened to shut off services in your home? Yes    Primary Care Provided by self    Provides Primary Care For no one    Family Caregiver if Needed significant other    Family Caregiver Names BRIANSuhail    Quality of Family Relationships helpful;involved;supportive    Able to Return to Prior Arrangements yes       Resource/Environmental Concerns    Resource/Environmental Concerns financial;environmental    Environment Concerns other (see comments)  Homeless    Financial Concerns food, unable to afford;medicine, unable to afford;rent or mortgage, unable to afford;unemployed    Transportation Concerns none       Transportation Needs    In the past 12 months, has lack of transportation kept you from medical appointments or from getting medications? yes    In the past 12 months, has lack of transportation kept you from meetings, work, or from getting things needed for daily living? Yes       Food Insecurity    Within the past 12 months, you worried that your food would run out before you got the money to buy more. Often true    Within the past 12 months, the food you bought just didn't last and you didn't have money to get more. Often true       Transition Planning    Patient/Family Anticipates Transition to home    Patient/Family Anticipated Services at Transition     Transportation Anticipated car, drives self       Discharge Needs Assessment    Readmission Within the Last 30 Days no previous  admission in last 30 days    Equipment Currently Used at Home none    Concerns to be Addressed discharge planning    Do you want help finding or keeping work or a job? I do not need or want help    Do you want help with school or training? For example, starting or completing job training or getting a high school diploma, GED or equivalent No    Anticipated Changes Related to Illness none    Equipment Needed After Discharge none                   Discharge Plan       Row Name 03/04/25 0949       Plan    Plan DC back to the community.    Patient/Family in Agreement with Plan yes    Plan Comments CM went to the bedside and spoke with the patient. Patient is currently living in her car with her SO, Juma. Patient does drive and she will transport herself back to her car. Patient states that she does need to be set up with a PCP, CM called the patient connection hub and set her up with an appointment this Thursday, 3/6/25 @ 3:30p, CM sent secure chat to floor RN to make sure she tells the patient on her AVS appointment with PCP has been made. Patient wishes to be enrolled in the MTB program, CM updated this in the chart. This CM provided the patient with the [facility] packet and the [facility] Resource packet. Please refer to SW note for drug abuse and homelessness f/u.                  Continued Care and Services - Admitted Since 3/2/2025    No active coordination exists for this encounter.          Demographic Summary       Row Name 03/04/25 0949       General Information    Admission Type observation    Arrived From emergency department    Referral Source admission list    Reason for Consult substance use concerns;abuse/neglect concerns    General Information Comments LSW received S/C from RNCM re: concerns for the need to reports opiate use to [facility] due to Pt being pregnant. LSW contacted [facility] who confirms no report warranted as there is not a child to report abuse on. No further needs identified.       Contact  Information    Permission Granted to Share Info With       Row Name 03/04/25 0947       General Information    Admission Type observation    Arrived From emergency department    Referral Source admission list    Reason for Consult discharge planning    Preferred Language English       Contact Information    Permission Granted to Share Info With                    Functional Status       Row Name 03/04/25 0947       Functional Status    Usual Activity Tolerance good    Current Activity Tolerance good       Functional Status, IADL    Medications independent    Meal Preparation independent    Housekeeping independent    Laundry independent    Shopping independent               Met with patient in room wearing PPE: mask.    Maintained distance greater than six feet and spent less than 15 minutes in the room.       Janny Velasquez RN

## 2025-03-04 NOTE — CASE MANAGEMENT/SOCIAL WORK
Continued Stay Note  [facility]     Patient Name: Jordana Herzog  MRN: [id number]  Today's Date: 3/4/2025    Admit Date: 3/2/2025    Plan: DC back to the community.   Discharge Plan       Row Name 03/04/25 1019       Plan    Plan Comments CM called and filed report with CPS. CM spoke with Luz Maria and was given report number of [id number].                  Discharge Codes    No documentation.                 Phone communication or documentation only - no physical contact with patient or family.       Janny Velasquez RN

## 2025-03-05 NOTE — OUTREACH NOTE
Prep Survey      Flowsheet Row Responses   [facility] facility patient discharged from? Non-BH   Is LACE score < 7 ? Non-BH Discharge   Eligibility Not Eligible   What are the reasons patient is not eligible? Other  [Pregnancy]   Does the patient have one of the following disease processes/diagnoses(primary or secondary)? Other   Prep survey completed? Yes            ANTONIO A - Registered Nurse

## 2025-03-06 ENCOUNTER — OFFICE VISIT (OUTPATIENT)
Dept: FAMILY MEDICINE CLINIC | Facility: CLINIC | Age: 25
End: 2025-03-06
Payer: MEDICAID

## 2025-03-06 VITALS
BODY MASS INDEX: 25.21 KG/M2 | OXYGEN SATURATION: 97 % | SYSTOLIC BLOOD PRESSURE: 100 MMHG | DIASTOLIC BLOOD PRESSURE: 60 MMHG | HEART RATE: 56 BPM | WEIGHT: 137 LBS | RESPIRATION RATE: 18 BRPM | HEIGHT: 62 IN

## 2025-03-06 DIAGNOSIS — Z3A.20 20 WEEKS GESTATION OF PREGNANCY: ICD-10-CM

## 2025-03-06 DIAGNOSIS — L85.3 DRY SKIN: ICD-10-CM

## 2025-03-06 DIAGNOSIS — O99.322 DRUG USE AFFECTING PREGNANCY IN SECOND TRIMESTER: ICD-10-CM

## 2025-03-06 DIAGNOSIS — Z76.89 ENCOUNTER TO ESTABLISH CARE: Primary | ICD-10-CM

## 2025-03-06 DIAGNOSIS — Z59.00 HOMELESS: ICD-10-CM

## 2025-03-06 RX ORDER — ONDANSETRON 4 MG/1
TABLET, ORALLY DISINTEGRATING ORAL
COMMUNITY
Start: 2025-03-05

## 2025-03-06 RX ORDER — DOCUSATE SODIUM 100 MG/1
100 CAPSULE, LIQUID FILLED ORAL 2 TIMES DAILY
COMMUNITY

## 2025-03-06 SDOH — ECONOMIC STABILITY - HOUSING INSECURITY: HOMELESSNESS UNSPECIFIED: Z59.00

## 2025-03-06 NOTE — PROGRESS NOTES
Chief Complaint  Chief Complaint   Patient presents with    Establish Care       Subjective        Newportelmer Herzog is a 24 y.o. female who presents to [facility].  History of Present Illness  Presents today to establish care and follow-up after being in the hospital from 3/2/2025 - 3/4/2025 due to generalized weakness and fatigue.    Medical conditions include:  Constipation- Colace 100 mg BID  20 weeks pregnant- prenatal vitamin, sees OBGYN  Nausea- Ondansetron 4 mg PRN  Drug use- Narcan nasal spray PRN    She is currently 20 weeks pregnant and homeless.  She has seen an OB/GYN regularly.  She had seen her OB/GYN a week prior or to presenting to the ER and had not had issues then.  UA was negative for bacteria, chlamydia negative.  Toxicology was positive for THC and opioids, told OB/GYN she last used fentanyl on 3/1/2025.  Chest x-ray was negative for cardiopulmonary findings.  They administered 2 mg of Narcan which improved her mental status as she was having transient hypotension and hypoxia, also improved after IV fluids were given.  OB/GYN thoroughly discussed risks of polysubstance abuse in pregnancy.  Before being discharged, she met with a  and , she requested to be discharged without inpatient treatment/evaluation.  They encouraged sobriety from fentanyl and she was given resources for outpatient drug use and prescription for Narcan.    Unsure of when last used fentanyl.   Vapes nicotine products daily, trying to reduce.   Denies other substance use or alcohol intake.   Interested in outpatient suboxone clinic, wants to attend [facility].   States she will be calling to make an appointment.     Reports she is living in her car, has been homeless for the past year.   Applying for housing.   Trying to get a job, has applied to [employer].   Has paperwork for applying for food and breast pump.   Works with her friend Elsa who checks in on  "her and is helping her fill out forms for housing and treatment for substance use.     Follows up with OBGYN on 4/4/2025.     Has concerns for diffuse dry skin.  Has itching/flaking of her skin head to toe.   Applies lotion daily, which has not helped.     Objective   /60 (BP Location: Left arm)   Pulse 56   Resp 18   Ht 157.5 cm (62.01\")   Wt 62.1 kg (137 lb)   SpO2 97%   BMI 25.05 kg/m²     Estimated body mass index is 25.05 kg/m² as calculated from the following:    Height as of this encounter: 157.5 cm (62.01\").    Weight as of this encounter: 62.1 kg (137 lb).     Physical Exam   GEN: In no acute distress  CV: Regular rate and rhythm, no murmurs, 2+ peripheral pulses, No extremity edema.   RESP: Lungs clear to auscultation anteriorly and posteriorly in all lung fields bilaterally.  SKIN: No rashes, excoriations on back  PSYCH: Affect normal, insight fair       Result Review :              Assessment and Plan     Assessment & Plan  Encounter to establish care         Drug use affecting pregnancy in second trimester  Declined referral to [facility] substance use clinic, wants to call [facility] first.   Encouraged her to make an appointment for further evaluation and management.  Offered referral for social work, however, she states she would rather work with her friend Elsa as there are too many hoops to jump through for social work.        Homeless  Encouraged her to continue applying for housing.        20 weeks gestation of pregnancy  Encouraged follow up with OBGYN.        Dry skin  Encouraged good skin hygiene.  Discussed applying lotion such as vanicream daily and consistently.       Discussed following up in 3 months to ensure she is working towards applying for housing, job, food supplementation, she is in agreement with this plan and will call with any issues or concerns in the meantime.       Follow Up     Return in about 3 months (around 6/6/2025) for Recheck.    "

## 2025-03-07 LAB
BACTERIA SPEC AEROBE CULT: NORMAL
BACTERIA SPEC AEROBE CULT: NORMAL

## 2025-06-29 ENCOUNTER — HOSPITAL ENCOUNTER (INPATIENT)
Facility: HOSPITAL | Age: 25
LOS: 3 days | Discharge: HOME | End: 2025-07-02
Attending: OBSTETRICS & GYNECOLOGY | Admitting: OBSTETRICS & GYNECOLOGY
Payer: MEDICAID

## 2025-06-29 ENCOUNTER — ANESTHESIA (OUTPATIENT)
Dept: LABOR AND DELIVERY | Facility: HOSPITAL | Age: 25
End: 2025-06-29
Payer: MEDICAID

## 2025-06-29 ENCOUNTER — ANESTHESIA EVENT (OUTPATIENT)
Dept: LABOR AND DELIVERY | Facility: HOSPITAL | Age: 25
End: 2025-06-29
Payer: MEDICAID

## 2025-06-29 PROBLEM — O60.03 PRETERM LABOR IN THIRD TRIMESTER: Status: ACTIVE | Noted: 2025-06-29

## 2025-06-29 PROBLEM — Z34.90 PREGNANT: Status: ACTIVE | Noted: 2025-06-29

## 2025-06-29 LAB
ABO GROUP BLD: NORMAL
AMPHET+METHAMPHET UR QL: POSITIVE
AMPHETAMINES UR QL: POSITIVE
BARBITURATES UR QL SCN: NEGATIVE
BENZODIAZ UR QL SCN: NEGATIVE
BLD GP AB SCN SERPL QL: NEGATIVE
BUPRENORPHINE SERPL-MCNC: NEGATIVE NG/ML
CANNABINOIDS SERPL QL: NEGATIVE
COCAINE UR QL: NEGATIVE
DEPRECATED RDW RBC AUTO: 48 FL (ref 37–54)
ERYTHROCYTE [DISTWIDTH] IN BLOOD BY AUTOMATED COUNT: 14.6 % (ref 12.3–15.4)
HCT VFR BLD AUTO: 39.6 % (ref 34–46.6)
HGB BLD-MCNC: 12.9 G/DL (ref 12–15.9)
MCH RBC QN AUTO: 29.7 PG (ref 26.6–33)
MCHC RBC AUTO-ENTMCNC: 32.6 G/DL (ref 31.5–35.7)
MCV RBC AUTO: 91 FL (ref 79–97)
METHADONE UR QL SCN: NEGATIVE
OPIATES UR QL: NEGATIVE
OXYCODONE UR QL SCN: NEGATIVE
PCP UR QL SCN: NEGATIVE
PLATELET # BLD AUTO: 178 10*3/MM3 (ref 140–450)
PMV BLD AUTO: 12.4 FL (ref 6–12)
RBC # BLD AUTO: 4.35 10*6/MM3 (ref 3.77–5.28)
RH BLD: POSITIVE
T&S EXPIRATION DATE: NORMAL
TRICYCLICS UR QL SCN: NEGATIVE
WBC NRBC COR # BLD AUTO: 21.16 10*3/MM3 (ref 3.4–10.8)

## 2025-06-29 PROCEDURE — 86900 BLOOD TYPING SEROLOGIC ABO: CPT | Performed by: OBSTETRICS & GYNECOLOGY

## 2025-06-29 PROCEDURE — 86780 TREPONEMA PALLIDUM: CPT | Performed by: OBSTETRICS & GYNECOLOGY

## 2025-06-29 PROCEDURE — 85027 COMPLETE CBC AUTOMATED: CPT | Performed by: OBSTETRICS & GYNECOLOGY

## 2025-06-29 PROCEDURE — 80306 DRUG TEST PRSMV INSTRMNT: CPT | Performed by: OBSTETRICS & GYNECOLOGY

## 2025-06-29 PROCEDURE — 86850 RBC ANTIBODY SCREEN: CPT | Performed by: OBSTETRICS & GYNECOLOGY

## 2025-06-29 PROCEDURE — 86901 BLOOD TYPING SEROLOGIC RH(D): CPT

## 2025-06-29 PROCEDURE — C1755 CATHETER, INTRASPINAL: HCPCS | Performed by: ANESTHESIOLOGY

## 2025-06-29 PROCEDURE — 25810000003 LACTATED RINGERS PER 1000 ML: Performed by: OBSTETRICS & GYNECOLOGY

## 2025-06-29 PROCEDURE — 86900 BLOOD TYPING SEROLOGIC ABO: CPT

## 2025-06-29 PROCEDURE — 25010000002 PENICILLIN G POTASSIUM PER 600000 UNITS: Performed by: OBSTETRICS & GYNECOLOGY

## 2025-06-29 PROCEDURE — 25010000002 LIDOCAINE-EPINEPHRINE (PF) 1.5 %-1:200000 SOLUTION: Performed by: ANESTHESIOLOGY

## 2025-06-29 PROCEDURE — 86901 BLOOD TYPING SEROLOGIC RH(D): CPT | Performed by: OBSTETRICS & GYNECOLOGY

## 2025-06-29 RX ORDER — SODIUM CHLORIDE, SODIUM LACTATE, POTASSIUM CHLORIDE, CALCIUM CHLORIDE 600; 310; 30; 20 MG/100ML; MG/100ML; MG/100ML; MG/100ML
125 INJECTION, SOLUTION INTRAVENOUS CONTINUOUS
Status: DISCONTINUED | OUTPATIENT
Start: 2025-06-29 | End: 2025-06-30

## 2025-06-29 RX ORDER — OXYTOCIN/0.9 % SODIUM CHLORIDE 30/500 ML
2 PLASTIC BAG, INJECTION (ML) INTRAVENOUS
Status: DISCONTINUED | OUTPATIENT
Start: 2025-06-29 | End: 2025-06-30

## 2025-06-29 RX ORDER — SODIUM CHLORIDE 0.9 % (FLUSH) 0.9 %
10 SYRINGE (ML) INJECTION EVERY 12 HOURS SCHEDULED
Status: DISCONTINUED | OUTPATIENT
Start: 2025-06-29 | End: 2025-06-30 | Stop reason: HOSPADM

## 2025-06-29 RX ORDER — SODIUM CHLORIDE 0.9 % (FLUSH) 0.9 %
10 SYRINGE (ML) INJECTION AS NEEDED
Status: DISCONTINUED | OUTPATIENT
Start: 2025-06-29 | End: 2025-06-30 | Stop reason: HOSPADM

## 2025-06-29 RX ORDER — FENTANYL/BUPIVACAINE/NS/PF 2-1250MCG
PLASTIC BAG, INJECTION (ML) INJECTION CONTINUOUS PRN
Status: DISCONTINUED | OUTPATIENT
Start: 2025-06-29 | End: 2025-06-30 | Stop reason: SURG

## 2025-06-29 RX ORDER — FENTANYL/BUPIVACAINE/NS/PF 2-1250MCG
PLASTIC BAG, INJECTION (ML) INJECTION
Status: COMPLETED
Start: 2025-06-29 | End: 2025-06-29

## 2025-06-29 RX ORDER — SODIUM CHLORIDE 9 MG/ML
40 INJECTION, SOLUTION INTRAVENOUS AS NEEDED
Status: DISCONTINUED | OUTPATIENT
Start: 2025-06-29 | End: 2025-06-30 | Stop reason: HOSPADM

## 2025-06-29 RX ORDER — LIDOCAINE HYDROCHLORIDE AND EPINEPHRINE 15; 5 MG/ML; UG/ML
INJECTION, SOLUTION EPIDURAL AS NEEDED
Status: DISCONTINUED | OUTPATIENT
Start: 2025-06-29 | End: 2025-06-30 | Stop reason: SURG

## 2025-06-29 RX ORDER — ONDANSETRON 2 MG/ML
4 INJECTION INTRAMUSCULAR; INTRAVENOUS EVERY 6 HOURS PRN
Status: DISCONTINUED | OUTPATIENT
Start: 2025-06-29 | End: 2025-06-30 | Stop reason: HOSPADM

## 2025-06-29 RX ORDER — LIDOCAINE HYDROCHLORIDE AND EPINEPHRINE 15; 5 MG/ML; UG/ML
INJECTION, SOLUTION EPIDURAL
Status: COMPLETED
Start: 2025-06-29 | End: 2025-06-29

## 2025-06-29 RX ORDER — ONDANSETRON 4 MG/1
4 TABLET, ORALLY DISINTEGRATING ORAL EVERY 6 HOURS PRN
Status: DISCONTINUED | OUTPATIENT
Start: 2025-06-29 | End: 2025-06-30 | Stop reason: HOSPADM

## 2025-06-29 RX ORDER — ACETAMINOPHEN 325 MG/1
650 TABLET ORAL EVERY 4 HOURS PRN
Status: DISCONTINUED | OUTPATIENT
Start: 2025-06-29 | End: 2025-06-30 | Stop reason: HOSPADM

## 2025-06-29 RX ORDER — LIDOCAINE HYDROCHLORIDE 10 MG/ML
0.5 INJECTION, SOLUTION EPIDURAL; INFILTRATION; INTRACAUDAL; PERINEURAL ONCE AS NEEDED
Status: DISCONTINUED | OUTPATIENT
Start: 2025-06-29 | End: 2025-06-30 | Stop reason: HOSPADM

## 2025-06-29 RX ADMIN — Medication 10 ML/HR: at 20:38

## 2025-06-29 RX ADMIN — SODIUM CHLORIDE 5 MILLION UNITS: 900 INJECTION INTRAVENOUS at 19:32

## 2025-06-29 RX ADMIN — LIDOCAINE HYDROCHLORIDE,EPINEPHRINE BITARTRATE 4 ML: 15; .005 INJECTION, SOLUTION EPIDURAL; INFILTRATION; INTRACAUDAL; PERINEURAL at 20:36

## 2025-06-29 RX ADMIN — SODIUM CHLORIDE, POTASSIUM CHLORIDE, SODIUM LACTATE AND CALCIUM CHLORIDE 125 ML/HR: 600; 310; 30; 20 INJECTION, SOLUTION INTRAVENOUS at 19:37

## 2025-06-29 RX ADMIN — Medication 2 MILLI-UNITS/MIN: at 22:59

## 2025-06-29 RX ADMIN — ACETAMINOPHEN 650 MG: 325 TABLET, FILM COATED ORAL at 22:27

## 2025-06-30 PROBLEM — Z34.90 PREGNANT: Status: RESOLVED | Noted: 2025-06-29 | Resolved: 2025-06-30

## 2025-06-30 PROBLEM — O60.03 PRETERM LABOR IN THIRD TRIMESTER: Status: RESOLVED | Noted: 2025-06-29 | Resolved: 2025-06-30

## 2025-06-30 LAB — TREPONEMA PALLIDUM IGG+IGM AB [PRESENCE] IN SERUM OR PLASMA BY IMMUNOASSAY: NORMAL

## 2025-06-30 PROCEDURE — 25010000002 PENICILLIN G POTASSIUM PER 600000 UNITS: Performed by: OBSTETRICS & GYNECOLOGY

## 2025-06-30 PROCEDURE — 0KQM0ZZ REPAIR PERINEUM MUSCLE, OPEN APPROACH: ICD-10-PCS | Performed by: OBSTETRICS & GYNECOLOGY

## 2025-06-30 PROCEDURE — 88307 TISSUE EXAM BY PATHOLOGIST: CPT | Performed by: OBSTETRICS & GYNECOLOGY

## 2025-06-30 RX ORDER — HYDROCORTISONE ACETATE PRAMOXINE HCL 2.5; 1 G/100G; G/100G
1 CREAM TOPICAL AS NEEDED
Status: DISCONTINUED | OUTPATIENT
Start: 2025-06-30 | End: 2025-07-02 | Stop reason: HOSPADM

## 2025-06-30 RX ORDER — DOCUSATE SODIUM 100 MG/1
100 CAPSULE, LIQUID FILLED ORAL 2 TIMES DAILY
Status: DISCONTINUED | OUTPATIENT
Start: 2025-06-30 | End: 2025-07-02 | Stop reason: HOSPADM

## 2025-06-30 RX ORDER — ONDANSETRON 4 MG/1
4 TABLET, ORALLY DISINTEGRATING ORAL EVERY 8 HOURS PRN
Status: DISCONTINUED | OUTPATIENT
Start: 2025-06-30 | End: 2025-07-02 | Stop reason: HOSPADM

## 2025-06-30 RX ORDER — OXYTOCIN/0.9 % SODIUM CHLORIDE 30/500 ML
999 PLASTIC BAG, INJECTION (ML) INTRAVENOUS ONCE
Status: DISCONTINUED | OUTPATIENT
Start: 2025-06-30 | End: 2025-06-30 | Stop reason: HOSPADM

## 2025-06-30 RX ORDER — SODIUM CHLORIDE 0.9 % (FLUSH) 0.9 %
1-10 SYRINGE (ML) INJECTION AS NEEDED
Status: DISCONTINUED | OUTPATIENT
Start: 2025-06-30 | End: 2025-07-02 | Stop reason: HOSPADM

## 2025-06-30 RX ORDER — MISOPROSTOL 200 UG/1
800 TABLET ORAL ONCE AS NEEDED
Status: DISCONTINUED | OUTPATIENT
Start: 2025-06-30 | End: 2025-06-30 | Stop reason: HOSPADM

## 2025-06-30 RX ORDER — BISACODYL 10 MG
10 SUPPOSITORY, RECTAL RECTAL DAILY PRN
Status: DISCONTINUED | OUTPATIENT
Start: 2025-07-01 | End: 2025-07-02 | Stop reason: HOSPADM

## 2025-06-30 RX ORDER — ACETAMINOPHEN 325 MG/1
650 TABLET ORAL EVERY 6 HOURS PRN
Status: DISCONTINUED | OUTPATIENT
Start: 2025-06-30 | End: 2025-07-02 | Stop reason: HOSPADM

## 2025-06-30 RX ORDER — CARBOPROST TROMETHAMINE 250 UG/ML
250 INJECTION, SOLUTION INTRAMUSCULAR
Status: DISCONTINUED | OUTPATIENT
Start: 2025-06-30 | End: 2025-06-30 | Stop reason: HOSPADM

## 2025-06-30 RX ORDER — OXYTOCIN/0.9 % SODIUM CHLORIDE 30/500 ML
125 PLASTIC BAG, INJECTION (ML) INTRAVENOUS CONTINUOUS PRN
Status: DISCONTINUED | OUTPATIENT
Start: 2025-06-30 | End: 2025-07-02 | Stop reason: HOSPADM

## 2025-06-30 RX ORDER — FERROUS SULFATE 325(65) MG
325 TABLET ORAL
Status: DISCONTINUED | OUTPATIENT
Start: 2025-06-30 | End: 2025-07-02 | Stop reason: HOSPADM

## 2025-06-30 RX ORDER — PRENATAL VIT/IRON FUM/FOLIC AC 27MG-0.8MG
1 TABLET ORAL DAILY
Status: DISCONTINUED | OUTPATIENT
Start: 2025-06-30 | End: 2025-07-02 | Stop reason: HOSPADM

## 2025-06-30 RX ORDER — METHYLERGONOVINE MALEATE 0.2 MG/ML
200 INJECTION INTRAVENOUS ONCE AS NEEDED
Status: DISCONTINUED | OUTPATIENT
Start: 2025-06-30 | End: 2025-06-30 | Stop reason: HOSPADM

## 2025-06-30 RX ORDER — IBUPROFEN 600 MG/1
600 TABLET, FILM COATED ORAL EVERY 6 HOURS PRN
Status: DISCONTINUED | OUTPATIENT
Start: 2025-06-30 | End: 2025-07-02 | Stop reason: HOSPADM

## 2025-06-30 RX ORDER — OXYTOCIN/0.9 % SODIUM CHLORIDE 30/500 ML
250 PLASTIC BAG, INJECTION (ML) INTRAVENOUS CONTINUOUS
Status: ACTIVE | OUTPATIENT
Start: 2025-06-30 | End: 2025-06-30

## 2025-06-30 RX ADMIN — DOCUSATE SODIUM 100 MG: 100 CAPSULE, LIQUID FILLED ORAL at 21:02

## 2025-06-30 RX ADMIN — FERROUS SULFATE TAB 325 MG (65 MG ELEMENTAL FE) 325 MG: 325 (65 FE) TAB at 08:08

## 2025-06-30 RX ADMIN — IBUPROFEN 600 MG: 600 TABLET ORAL at 17:15

## 2025-06-30 RX ADMIN — SODIUM CHLORIDE 2.5 MILLION UNITS: 9 INJECTION, SOLUTION INTRAVENOUS at 00:02

## 2025-06-30 RX ADMIN — DOCUSATE SODIUM 100 MG: 100 CAPSULE, LIQUID FILLED ORAL at 08:08

## 2025-06-30 RX ADMIN — Medication 1 G: at 04:17

## 2025-06-30 RX ADMIN — ACETAMINOPHEN 650 MG: 325 TABLET, FILM COATED ORAL at 21:09

## 2025-06-30 RX ADMIN — PRENATAL VITAMINS-IRON FUMARATE 27 MG IRON-FOLIC ACID 0.8 MG TABLET 1 TABLET: at 08:08

## 2025-06-30 RX ADMIN — IBUPROFEN 600 MG: 600 TABLET ORAL at 05:10

## 2025-06-30 RX ADMIN — WITCH HAZEL: 500 SOLUTION RECTAL; TOPICAL at 04:17

## 2025-06-30 NOTE — CASE MANAGEMENT/SOCIAL WORK
Social Work Assessment  [facility]     Patient Name: Jordana Herzog  MRN: [id number]  Today's Date: 6/30/2025    Admit Date: 6/29/2025       Discharge Plan       Row Name 06/30/25 1313       Plan    Plan Anticipate routine home.    Plan Comments LSW attempted to meet with patient at bedside, but patient was asleep. LSW to attempt again at a later time.             BELLE Randle, LSW    Phone: [phone]  Fax: [phone]  [email or web address]

## 2025-06-30 NOTE — H&P
STEPHANIE Kemp  Obstetric History and Physical     Chief Complaint: Contractions    Subjective     Patient is a 25 y.o. female  currently at 36+6 , who presents with painful contractions.    Her prenatal care is complicated by maternal substance use disorder pt is using fentanyl regularly with last dose 3-4 days ago, she also has had an overdose during this pregnancy.  She has housing and food insecurity and was homeless during some of her pregnancy.  She also has a history of bipolar d/o and depression.  Her previous obstetric/gynecological history is noted for is non-contributory.      Prenatal Information:  See office H&P for full details and labs.      Past OB History:       OB History    Para Term  AB Living   2 0 0 0 1 0   SAB IAB Ectopic Molar Multiple Live Births   1 0 0 0 0 0               Past Medical History: Past Medical History:   Diagnosis Date    Acid reflux     Allergic     Anxiety     Bipolar disorder     Depression     Sinusitis     Strep throat         Past Surgical History Past Surgical History:   Procedure Laterality Date    ADENOIDECTOMY      TONSILLECTOMY      WISDOM TOOTH EXTRACTION          Family History: Family History   Adopted: Yes   Problem Relation Age of Onset    ADD / ADHD Mother     Depression Mother     Anxiety disorder Mother     Bipolar disorder Mother     Arthritis Mother     Anemia Mother     ADD / ADHD Father     Depression Father     Anxiety disorder Father     Anemia Sister     Heart murmur Sister     Obesity Paternal Uncle       Social History:  reports that she has been smoking electronic cigarette. She started smoking about 5 years ago. She has been exposed to tobacco smoke. She has quit using smokeless tobacco.   reports no history of alcohol use.   reports current drug use. Drug: Fentanyl.        General ROS: Pertinent items are noted in HPI    Objective      Vitals:     Vitals:    25 2101 25 2130 25 2200 25 2230   BP: 102/68  98/77 96/54 101/69   BP Location:       Patient Position:       Pulse: 103 87 104 97   Resp:       Temp:       TempSrc:       SpO2: 100% 99% 93% 95%       Fetal Heart Rate Assessment:   Category 1    [place]:   q 2     Physical Exam:     General Appearance:    Alert, cooperative, in no acute distress   Abdomen:     Soft, non-tender, no guarding, no rebound tenderness,       EFW 5#, possibly less   Pelvic Exam:    Pelvis adequate.    Presentation: Vertex    Cervix: was checked (by me): 6 cm / 90% % / -1 and AROM- Clear   Extremities:   Moves all extremities well, no edema, no cyanosis, no              redness   Skin:   No bleeding, bruising or rash   Neurologic:   No focal neurologic defect          Laboratory Results:   Lab Results (last 48 hours)       Procedure Component Value Units Date/Time    Urine Drug Screen - Urine, Clean Catch [[id number]]  (Abnormal) Collected: 06/29/25 1855    Specimen: Urine, Clean Catch Updated: 06/29/25 1923     THC, Screen, Urine Negative     Phencyclidine (PCP), Urine Negative     Cocaine Screen, Urine Negative     Methamphetamine, Ur Positive     Opiate Screen Negative     Amphetamine Screen, Urine Positive     Benzodiazepine Screen, Urine Negative     Tricyclic Antidepressants Screen Negative     Methadone Screen, Urine Negative     Barbiturates Screen, Urine Negative     Oxycodone Screen, Urine Negative     Buprenorphine, Screen, Urine Negative    Narrative:      Cutoff For Drugs Screened:    Amphetamines               500 ng/ml  Barbiturates               200 ng/ml  Benzodiazepines            150 ng/ml  Cocaine                    150 ng/ml  Methadone                  200 ng/ml  Opiates                    100 ng/ml  Phencyclidine               25 ng/ml  THC                         50 ng/ml  Methamphetamine            500 ng/ml  Tricyclic Antidepressants  300 ng/ml  Oxycodone                  100 ng/ml  Buprenorphine               10 ng/ml    The normal value for all drugs tested is  negative. This report includes unconfirmed screening results, with the cutoff values listed, to be used for medical treatment purposes only.  Unconfirmed results must not be used for non-medical purposes such as employment or legal testing.  Clinical consideration should be applied to any drug of abuse test, particularly when unconfirmed results are used.    All urine drugs of abuse requests without chain of custody are for medical screening purposes only.  False positives are possible.      CBC (No Diff) [[id number]]  (Abnormal) Collected: 25    Specimen: Blood Updated: 25     WBC 21.16 10*3/mm3      RBC 4.35 10*6/mm3      Hemoglobin 12.9 g/dL      Hematocrit 39.6 %      MCV 91.0 fL      MCH 29.7 pg      MCHC 32.6 g/dL      RDW 14.6 %      RDW-SD 48.0 fl      MPV 12.4 fL      Platelets 178 10*3/mm3     Treponema pallidum AB w/Reflex RPR [[id number]] Collected: 25    Specimen: Blood Updated: 25               Assessment & Plan     Principal Problem:    Pregnant         Assessment:  1.  Intrauterine pregnancy at 36+6 wks gestation.    2.   labor  3.  GBS status:Unknown  4. Substance use disorder with toxicology today positive for Methamphetamine and amphetamines.    Plan:  1. Expectant management, PCN for GBS. , and will consult  for patient substance use history and housing insecurity.   2. Plan of care has been reviewed with patient.  3.  Risks, benefits of treatment plan have been discussed.  4.  All questions have been answered.         Arcelia Cox MD   2025   23:08 EDT

## 2025-06-30 NOTE — L&D DELIVERY NOTE
[facility]  Vaginal Delivery Note    Diagnosis     Patient is a 25 y.o. female  currently at 37w0d, who presents with labor.      Delivery     Delivery:  Spontaneous Vaginal Delivery    Date of Delivery:  2025   Anesthesia:  Epidural   Delivering clinician: Arcelia Cox MD      Delivery narrative:  Pt presented in labor at 36+6 wks.  She progressed on a normal labor curve with a category 1 tracing throughout.  She pushed with excellent effort for just about 30 minutes to deliver a vigorous infant without difficulty.     Infant    Findings: VFI     Apgars: 9 & 9 at 1 and 5 minutes.      Placenta, Cord, and Fluid     Delayed cord clamping performed per routine.       Placenta delivered spontaneous, intact  3VC      Bimanual massage and Pitocin 30 units in 500 mL bolus given after placental delivery.  There was good hemostasis and a firm uterine tone.        Lacerations       had a 2nd degree laceration, repaired c 3.0 vicryl rapide in the usual fashion.    Quantitiative Blood Loss  300  mL     Sponge and needle counts correct x 2.     Disposition  Mother to Mother Baby/Postpartum  in stable condition currently.  Baby to remains with mom  in stable condition currently.      Arcelia Cox MD  25  02:21 EDT

## 2025-06-30 NOTE — ANESTHESIA PREPROCEDURE EVALUATION
Anesthesia Evaluation     Patient summary reviewed and Nursing notes reviewed                Airway   Mallampati: II  TM distance: >3 FB  Neck ROM: full  No difficulty expected  Dental - normal exam     Pulmonary    Cardiovascular         Neuro/Psych  GI/Hepatic/Renal/Endo    (+) GERD    Musculoskeletal     Abdominal    Substance History   (+) drug use     OB/GYN    (+) Pregnant        Other            Phys Exam Other: scoliosis            Anesthesia Plan    ASA 2     epidural       Anesthetic plan, risks, benefits, and alternatives have been provided, discussed and informed consent has been obtained with: patient.    CODE STATUS:    Code Status (Patient has no pulse and is not breathing): CPR (Attempt to Resuscitate)  Medical Interventions (Patient has pulse or is breathing): Full Support  Level Of Support Discussed With: Patient

## 2025-06-30 NOTE — ANESTHESIA PROCEDURE NOTES
Labor Epidural    Pre-sedation assessment completed: 6/29/2025 8:18 PM    Patient reassessed immediately prior to procedure    Patient location during procedure: OB  Start Time: 6/29/2025 8:18 PM  Stop Time: 6/29/2025 8:38 PM  Performed By  Anesthesiologist: Joseph Laura MD  Preanesthetic Checklist  Completed: patient identified, IV checked, site marked, risks and benefits discussed, surgical consent, monitors and equipment checked, pre-op evaluation and timeout performed  Prep:  Pt Position:sitting  Sterile Tech:gloves, cap, sterile barrier and mask  Prep:chlorhexidine gluconate and isopropyl alcohol  Monitoring:continuous pulse oximetry, EKG and blood pressure monitoring  Epidural Block Procedure:  Approach:midline  Guidance:landmark technique and palpation technique  Location:L3-L4  Needle Type:Tuohy  Needle Gauge:17 G  Loss of Resistance Medium: saline  Loss of Resistance: 5cm  Cath Depth at skin:10 cm  Paresthesia: none  Aspiration:negative  Test Dose:negative  Number of Attempts: 1  Post Assessment:  Dressing:secured with tape and occlusive dressing applied  Pt Tolerance:patient tolerated the procedure well with no apparent complications  Complications:no

## 2025-07-01 LAB
BASOPHILS # BLD AUTO: 0.03 10*3/MM3 (ref 0–0.2)
BASOPHILS NFR BLD AUTO: 0.2 % (ref 0–1.5)
DEPRECATED RDW RBC AUTO: 49.7 FL (ref 37–54)
EOSINOPHIL # BLD AUTO: 0.06 10*3/MM3 (ref 0–0.4)
EOSINOPHIL NFR BLD AUTO: 0.4 % (ref 0.3–6.2)
ERYTHROCYTE [DISTWIDTH] IN BLOOD BY AUTOMATED COUNT: 15.1 % (ref 12.3–15.4)
HCT VFR BLD AUTO: 32.3 % (ref 34–46.6)
HGB BLD-MCNC: 10.6 G/DL (ref 12–15.9)
IMM GRANULOCYTES # BLD AUTO: 0.1 10*3/MM3 (ref 0–0.05)
IMM GRANULOCYTES NFR BLD AUTO: 0.7 % (ref 0–0.5)
LAB AP CASE REPORT: NORMAL
LYMPHOCYTES # BLD AUTO: 3.19 10*3/MM3 (ref 0.7–3.1)
LYMPHOCYTES NFR BLD AUTO: 21.5 % (ref 19.6–45.3)
MCH RBC QN AUTO: 29.9 PG (ref 26.6–33)
MCHC RBC AUTO-ENTMCNC: 32.8 G/DL (ref 31.5–35.7)
MCV RBC AUTO: 91.2 FL (ref 79–97)
MONOCYTES # BLD AUTO: 1.22 10*3/MM3 (ref 0.1–0.9)
MONOCYTES NFR BLD AUTO: 8.2 % (ref 5–12)
NEUTROPHILS NFR BLD AUTO: 10.25 10*3/MM3 (ref 1.7–7)
NEUTROPHILS NFR BLD AUTO: 69 % (ref 42.7–76)
NRBC BLD AUTO-RTO: 0 /100 WBC (ref 0–0.2)
PATH REPORT.FINAL DX SPEC: NORMAL
PATH REPORT.GROSS SPEC: NORMAL
PLATELET # BLD AUTO: 174 10*3/MM3 (ref 140–450)
PMV BLD AUTO: 12.4 FL (ref 6–12)
RBC # BLD AUTO: 3.54 10*6/MM3 (ref 3.77–5.28)
WBC NRBC COR # BLD AUTO: 14.85 10*3/MM3 (ref 3.4–10.8)

## 2025-07-01 PROCEDURE — 97161 PT EVAL LOW COMPLEX 20 MIN: CPT

## 2025-07-01 PROCEDURE — 85025 COMPLETE CBC W/AUTO DIFF WBC: CPT | Performed by: OBSTETRICS & GYNECOLOGY

## 2025-07-01 RX ADMIN — IBUPROFEN 600 MG: 600 TABLET ORAL at 20:24

## 2025-07-01 RX ADMIN — PRENATAL VITAMINS-IRON FUMARATE 27 MG IRON-FOLIC ACID 0.8 MG TABLET 1 TABLET: at 09:46

## 2025-07-01 RX ADMIN — IBUPROFEN 600 MG: 600 TABLET ORAL at 06:02

## 2025-07-01 RX ADMIN — ACETAMINOPHEN 650 MG: 325 TABLET, FILM COATED ORAL at 15:11

## 2025-07-01 RX ADMIN — FERROUS SULFATE TAB 325 MG (65 MG ELEMENTAL FE) 325 MG: 325 (65 FE) TAB at 09:47

## 2025-07-01 RX ADMIN — DOCUSATE SODIUM 100 MG: 100 CAPSULE, LIQUID FILLED ORAL at 09:46

## 2025-07-01 RX ADMIN — DOCUSATE SODIUM 100 MG: 100 CAPSULE, LIQUID FILLED ORAL at 20:24

## 2025-07-01 RX ADMIN — IBUPROFEN 600 MG: 600 TABLET ORAL at 12:30

## 2025-07-01 NOTE — PLAN OF CARE
Goal Outcome Evaluation:  Plan of Care Reviewed With: patient           Outcome Evaluation: Pt is a 26 y/o F, , who came to [facility] at 37 wks 0 d gestation. Pt gave birth via vaginal delivery on 25 with a 2nd deg laceration noted. She lives with her significant other in a townhouse. Today pt reported mild soreness in back and abdomen. She has been up ad ana maria with no mobility concerns. During today's eval pt was educated on care and healing post vaginal delivery, including abdominal pressure management with activity and bowel movement, recovery exercise, and perineal scar massage. She reports having history of constipation and GI issues. Pt educated on abdominal massage and constipation routine. She demonstrated good understanding of material and was given a handout with all education and contact information if any additional questions arise while inpatient or post d/c.    Vaginal Delivery Education:  -Body changes after pregnancy  -Pelvic floor, abdomen  -Diaphragmatic breathing  -Proper breathing/pressure management/exhale with movement  -Proper kegel performance, importance of relaxation   -LAURA  -Toileting posture/breathing during toileting   -Perineal Scar work/desensitization   -When it may be beneficial to see a Pelvic PT    Anticipated Discharge Disposition (PT): home

## 2025-07-01 NOTE — PLAN OF CARE
Goal Outcome Evaluation:  Plan of Care Reviewed With: patient        Progress: improving   Patient overall condition is improving, bleeding and pain of C/S incisional and epidural site is controlled. Patient is also gaining more independence of care needs for baby, participating more with feeds and improving structure of feeding plan. Patient denied headache/dizziness. Reviewed education of safe sleep and feeding patient confirmed understanding.

## 2025-07-01 NOTE — THERAPY EVALUATION
Patient Name: Jordana Herzog  : 2000    MRN: [id number]                              Today's Date: 2025       Admit Date: 2025    Visit Dx: No diagnosis found.  Patient Active Problem List   Diagnosis    Birth control counseling    At risk for sexually transmitted disease due to unprotected sex    Generalized weakness     (normal spontaneous vaginal delivery)     Past Medical History:   Diagnosis Date    Acid reflux     Allergic     Anxiety     Bipolar disorder     Depression     Sinusitis     Strep throat      Past Surgical History:   Procedure Laterality Date    ADENOIDECTOMY      TONSILLECTOMY      WISDOM TOOTH EXTRACTION        General Information       [facility] Name 25 1108          Physical Therapy Time and Intention    Document Type evaluation  -     Mode of Treatment physical therapy  -       Row Name 25 1108          General Information    Patient Profile Reviewed yes  -     Prior Level of Function independent:  -     Existing Precautions/Restrictions no known precautions/restrictions  -     Barriers to Rehab none identified  -[facility] Name 25 110          Living Environment    Current Living Arrangements apartment  -     People in Home significant other  -       Row Name 25 1108          Home Main Entrance    Number of Stairs, Main Entrance none  -       Row Name 25 1108          Stairs Within Home, Primary    Number of Stairs, Within Home, Primary ten  -       Row Name 25 1108          Cognition    Orientation Status (Cognition) oriented x 4  -               User Key  (r) = Recorded By, (t) = Taken By, (c) = Cosigned By      Initials Name Provider Type     Cecille Menchaca, PT Physical Therapist                   Mobility       Row Name 25 1108          Bed Mobility    Bed Mobility bed mobility (all) activities  -     All Activities, [place] (Bed Mobility) independent  -       Row Name 25 1108           Sit-Stand Transfer    Sit-Stand [place] (Transfers) independent  -[facility] Name 25 1108          Gait/Stairs (Locomotion)    [place] Level (Gait) independent  -     Patient was able to Ambulate yes  -               User Key  (r) = Recorded By, (t) = Taken By, (c) = Cosigned By      Initials Name Provider Type    Cecille Fagan PT Physical Therapist                   Obj/Interventions       Row Name 25 110          Range of Motion Comprehensive    General Range of Motion no range of motion deficits identified  -AH       Row Name 25 1108          Strength Comprehensive (MMT)    General Manual Muscle Testing (MMT) Assessment no strength deficits identified  -AH       Row Name 25 1108          Balance    Balance Assessment sitting static balance;sitting dynamic balance;standing static balance;standing dynamic balance  -     Static Sitting Balance independent  -     Dynamic Sitting Balance independent  -     Position, Sitting Balance unsupported  -     Static Standing Balance independent  -     Dynamic Standing Balance independent  -     Position/Device Used, Standing Balance unsupported  -AH       Row Name 25 110          Sensory Assessment (Somatosensory)    Sensory Assessment (Somatosensory) sensation intact  -               User Key  (r) = Recorded By, (t) = Taken By, (c) = Cosigned By      Initials Name Provider Type    Cecille Fagan PT Physical Therapist                   Goals/Plan    No documentation.                  Clinical Impression       Row Name 25 110          Pain    Pretreatment Pain Rating 3/10  -     Posttreatment Pain Rating 3/10  -     Pain Side/Orientation generalized  -AH       Row Name 25 110          Plan of Care Review    Plan of Care Reviewed With patient  -     Outcome Evaluation Pt is a 24 y/o F, , who came to [facility] at 37 wks 0 d gestation. Pt gave birth via vaginal  delivery on 6/30/25 with a 2nd deg laceration noted. She lives with her significant other in a townhouse. Today pt reported mild soreness in back and abdomen. She has been up ad ana maria with no mobility concerns. During today's eval pt was educated on care and healing post vaginal delivery, including abdominal pressure management with activity and bowel movement, recovery exercise, and perineal scar massage. She reports having history of constipation and GI issues. Pt educated on abdominal massage and constipation routine. She demonstrated good understanding of material and was given a handout with all education and contact information if any additional questions arise while inpatient or post d/c.  -       Row Name 07/01/25 1108          Therapy Assessment/Plan (PT)    Criteria for Skilled Interventions Met (PT) no  -     Therapy Frequency (PT) evaluation only  -       Row Name 07/01/25 1108          Positioning and Restraints    Pre-Treatment Position in bed  -     Post Treatment Position bed  -     In Bed notified nsg;fowlers;call light within reach;with family/caregiver  -               User Key  (r) = Recorded By, (t) = Taken By, (c) = Cosigned By      Initials Name Provider Type    Cecille Fagan, PT Physical Therapist                   Outcome Measures    No documentation.                                Physical Therapy Education       Title: PT OT SLP Therapies (Done)       Topic: Physical Therapy (Done)       Point: Home exercise program (Done)       Learning Progress Summary            Patient Acceptance, E, VU by  at 7/1/2025 1110                      Point: Body mechanics (Done)       Learning Progress Summary            Patient Acceptance, E, VU by  at 7/1/2025 1110                      Point: Precautions (Done)       Learning Progress Summary            Patient Acceptance, E, VU by  at 7/1/2025 1110                                      User Key       Initials Effective Dates  Name Provider Type [facility] 24 -  Cecille Menchaca, PT Physical Therapist PT                  PT Recommendation and Plan     Outcome Evaluation: Pt is a 26 y/o F, , who came to [facility] at 37 wks 0 d gestation. Pt gave birth via vaginal delivery on 25 with a 2nd deg laceration noted. She lives with her significant other in a townhouse. Today pt reported mild soreness in back and abdomen. She has been up ad ana maria with no mobility concerns. During today's eval pt was educated on care and healing post vaginal delivery, including abdominal pressure management with activity and bowel movement, recovery exercise, and perineal scar massage. She reports having history of constipation and GI issues. Pt educated on abdominal massage and constipation routine. She demonstrated good understanding of material and was given a handout with all education and contact information if any additional questions arise while inpatient or post d/c.    Vaginal Delivery Education:  -Body changes after pregnancy  -Pelvic floor, abdomen  -Diaphragmatic breathing  -Proper breathing/pressure management/exhale with movement  -Proper kegel performance, importance of relaxation   -LAURA  -Toileting posture/breathing during toileting   -Perineal Scar work/desensitization   -When it may be beneficial to see a Pelvic PT     Time Calculation:         PT Charges       Row Name 25 1110             Time Calculation    Start Time 1015  -      Stop Time 1033  -      Time Calculation (min) 18 min  -      PT Received On 25  -                User Key  (r) = Recorded By, (t) = Taken By, (c) = Cosigned By      Initials Name Provider Type     Cecille Menchaca, PT Physical Therapist                  Therapy Charges for Today       Code Description Service Date Service Provider Modifiers Qty    [id number]  PT EVAL LOW COMPLEXITY 4 2025 Cecille Menchaca, PT GP 1               PT Discharge  Summary  Anticipated Discharge Disposition (PT): home    Cecille Menchaca, PT  7/1/2025

## 2025-07-01 NOTE — PROGRESS NOTES
STEPHANIE Kemp  Postpartum Note    Subjective   Postpartum Day 1:  Spontaneous Vaginal Delivery    Patient without complaints. Her pain is well controlled with nonsteroidal anti-inflammatory drugs. She is ambulating well.  Patient describes her bleeding as appropriate for PP period . No dizziness or fatigue    Breastfeeding and bottle feeding for infant nutrition.     Objective     Vitals:  Vitals:    25 1513 25 1921 25 2254 25 0256   BP: 109/63 106/64 100/58 105/55   BP Location: Left arm Left arm Left arm Right arm   Patient Position: Sitting Lying Lying Lying   Pulse: 84 82 81 74   Resp: 16 15 17 16   Temp: 98 °F (36.7 °C) 97.9 °F (36.6 °C) 98.4 °F (36.9 °C) 97.8 °F (36.6 °C)   TempSrc: Oral Oral Oral Oral   SpO2: 97% 96% 98% 98%   Weight:       Height:           Physical Exam:  General:  Alert and oriented x3. No acute distress.  Abdomen: abdomen is soft without significant tenderness, masses, organomegaly or guarding. Fundus: appropriate, firm, non tender  Incision: N/A  Skin: Warm, Dry  Extremities: Normal,  trace edema. Nontender     Labs:  Results from last 7 days   Lab Units 25  0436 25  1852   WBC 10*3/mm3 14.85* 21.16*   HEMOGLOBIN g/dL 10.6* 12.9   HEMATOCRIT % 32.3* 39.6   PLATELETS 10*3/mm3 174 178            Feeding method: Breastfeeding Status: Yes     Blood Type: RH Positive        Assessment & Plan     Active Problems:     (normal spontaneous vaginal delivery)      Points STELLA Mino is Day 1  post-partum from a  Spontaneous Vaginal Delivery      Plan:  routine, continue present management, and plan d/c tomorrow.       Miriam Martinez NP  2025  08:23 EDT

## 2025-07-01 NOTE — PLAN OF CARE
Goal Outcome Evaluation:              Outcome Evaluation: Pt voiding, up ad ana maria. Pt's pain is managed with Tylenol and Motrin.

## 2025-07-01 NOTE — CASE MANAGEMENT/SOCIAL WORK
"Social Work Assessment  [facility]     Patient Name: Jordana Herzog  MRN: [id number]  Today's Date: 7/1/2025    Admit Date: 6/29/2025       Discharge Plan       Row Name 07/01/25 1526       Plan    Plan Routine home with saradominic.    Patient/Family in Agreement with Plan yes    Plan Comments LSW met with pt, pt's significant other (Juma - ELLE), and pt's \"foster/adoptive mother\" (Naomi) at bedside, introduced self/role, and reason for visit. LSW asked pt if she has a PCP for herself and pediatrician for infant. Pt stated she has both providers established, but looking for new PCP. LSW offered to place pt connection hub information on [employer] paperwork, pt agreeable. LSW inquired about pt's insurance and pt stated she has Medicaid and infant has been screened to be added to coverage. LSW asked about supplies (diapers, wipes, car seat, pack-and-play, crib, etc.) and pt stated they have everything for infant except wipes. LSW inquired about current living situation and if there are any safety concerns in the home. Pt stated she lives at home with FOB and now infant and there are no safety concerns. LSW asked about WIC and pt stated they are current with [facility]. LSW inquired about mental health treatment and pt stated she has participated in services. LSW asked about medication for mental health and substance use and pt stated she doesn't take any. LSW asked pt about completing a urine drug screen (UDS) upon admission. Pt confirmed and stated she knew the results which are positive for methamphetamine and amphetamine. LSW asked pt about history of substance use and pt stated she last used methamphetamine approximately 3-4 weeks ago, but used fentanyl earlier in the pregnancy. Pt stated she wasn't aware that she was pregnant at the time of using fentanyl and didn't know until she was approximately 5 months pregnant. LSW discussed infant's UDS results which were negative and pending umbilical cord toxicology (cord " tox) screen. Pt asked about [place] Department of Child Services (DCS) involvement. LSW discussed mandated reporting policy due to admitted use and pt's positive UDS, pt verbalized understanding. LSW discussed signs/symptoms of postpartum, mental health resources, and community resources ([place]'s, CAPS, Healthy Families, CHOICES, etc.) and offered pt printed materials. Pt expressed need for community resources due to hardships in multiple areas. LSW discussed [place] service guide for resources and offered pt printed information. Pt agreeable to receiving and LSW provided all discussed information at bedside. LSW made report to DCS today (7/1) at 1538. Report made to [place] and report was recommended for assessment. Report ID: [id number].             BELLE Randle, W    Phone: [phone]  Fax: [phone]  [email or web address]

## 2025-07-02 VITALS
HEIGHT: 62 IN | DIASTOLIC BLOOD PRESSURE: 68 MMHG | SYSTOLIC BLOOD PRESSURE: 101 MMHG | TEMPERATURE: 97.9 F | BODY MASS INDEX: 23.94 KG/M2 | RESPIRATION RATE: 18 BRPM | OXYGEN SATURATION: 98 % | WEIGHT: 130.07 LBS | HEART RATE: 83 BPM

## 2025-07-02 RX ORDER — DOCUSATE SODIUM 100 MG/1
100 CAPSULE, LIQUID FILLED ORAL 2 TIMES DAILY PRN
Qty: 60 CAPSULE | Refills: 0 | Status: SHIPPED | OUTPATIENT
Start: 2025-07-02

## 2025-07-02 RX ORDER — IBUPROFEN 600 MG/1
600 TABLET, FILM COATED ORAL EVERY 6 HOURS PRN
Qty: 20 TABLET | Refills: 0 | Status: SHIPPED | OUTPATIENT
Start: 2025-07-02

## 2025-07-02 RX ORDER — FERROUS SULFATE 325(65) MG
325 TABLET ORAL
Qty: 30 TABLET | Refills: 1 | Status: SHIPPED | OUTPATIENT
Start: 2025-07-03

## 2025-07-02 RX ADMIN — IBUPROFEN 600 MG: 600 TABLET ORAL at 08:11

## 2025-07-02 RX ADMIN — DOCUSATE SODIUM 100 MG: 100 CAPSULE, LIQUID FILLED ORAL at 08:10

## 2025-07-02 RX ADMIN — FERROUS SULFATE TAB 325 MG (65 MG ELEMENTAL FE) 325 MG: 325 (65 FE) TAB at 08:11

## 2025-07-02 RX ADMIN — PRENATAL VITAMINS-IRON FUMARATE 27 MG IRON-FOLIC ACID 0.8 MG TABLET 1 TABLET: at 08:11

## 2025-07-02 RX ADMIN — ACETAMINOPHEN 650 MG: 325 TABLET, FILM COATED ORAL at 00:30

## 2025-07-02 NOTE — PLAN OF CARE
Goal Outcome Evaluation:         Pt okay to discharge per MD order. Pt infant being taken to NICU per DCS order. Pt pain controlled with PRN medications. Fundus and bleeding WDL. VSS. Discharge instructions reviewed with patient; verbalized understanding. 6 week follow up appt made and given to patient.

## 2025-07-02 NOTE — CASE MANAGEMENT/SOCIAL WORK
Social Work Assessment  [facility]     Patient Name: Jordana Herzog  MRN: [id number]  Today's Date: 7/2/2025    Admit Date: 6/29/2025       Discharge Plan       Row Name 07/02/25 1348       Plan    Plan Return home with family.    Patient/Family in Agreement with Plan yes    Plan Comments LSW spoke with [facility] Department of Child Services (DCS) supervisor (Meka FERGUSON) today (7/2) to inquire about disposition for infant. Supervisor stated Family  (FCM) had not yet been assigned as infant's discharge date wasn't determined. LSW explained discharge orders were placed for infant today. Supervisor stated FCM would be at the hospital immediately to speak with patient. LSW attempted to meet with patient at bedside to provide update, but patient was asleep. FCM (Janneth PATTERSON) arrived on unit and asked LSW if infant would be discharged immediately. LSW confirmed, stating infant had discharge orders placed and without disposition, infant will be discharged home with mother. LSW asked if discharge needs to be delayed, FCM stated that would be determined after she spoke with patient. FCM asked if there were concerns, LSW asked RN (Scarlett AVILA) about concerns, RN provided information to FCM. FCM met with patient at bedside. FCM later contacted LSW and explained infant is being detained by DCS and placement at that time had not been determined. LSW requested court order for detainment and official letter stating placement once available, FCM agreeable. LSW asked about infant being with patient, FCM stated infant can be with patient until discharge and once patient is discharged, she is to leave immediately and have no contact with infant. LSW attempted to meet with patient at bedside, but patient was asleep. LSW spoke with patient's significant other (FOB) to inquire about DCS update. FOB stated they were told by FCM that a more long-term plan needed to be in place. LSW asked about discharge, FOB stated infant was going to  "be discharged with patient's \"step-mom\" that was at bedside yesterday (7/1). LSW explained once patient's discharge orders were placed, they would leave the hospital and infant would be taken to the NICU until placement is able to assume care. FOB verbalized understanding. LSW updated care team via secure chat of disposition. Patient discharged, infant taken to NICU for care. LSW following for discharge planning.             BELLE Randle, W    Phone: [phone]  Fax: [phone]  [email or web address]     "

## 2025-07-02 NOTE — DISCHARGE SUMMARY
[facility]  Delivery Discharge Summary    Primary OB Clinician: Arcelia Cox MD    Admission Diagnosis:  Active Problems:     (normal spontaneous vaginal delivery)      Discharge Diagnosis:  Same, delivered    Gestational Age: 37w0d    Date of Delivery: 2025    Delivered By:  Arcelia Cox    Delivery Type: Vaginal, Spontaneous     Tubal Ligation: n/a    Intrapartum Course: See delivery note for details.    Postpartum Course:  Pt was admitted and underwent  Spontaneous Vaginal Delivery. Pt was transferred to PP where she had an uncomplicated course. Pt remained AFVSS, had scant lochia and pain was well controlled. Hgb stable and pt asymptomatic. Pt c hx of substance use during pregnancy, +UDS for methamphetamine and amphetamines was noted on admission - SW/DCS following, see note. Pt d/c home in stable condition and will f/u in office for PP visit as scheduled or PRN. Currently breastfeeding. Plans on unsure for contraception.     Physical Exam:    Vitals:   Vitals:    25 0256 25 0755 25 1533 25 2357   BP: 105/55 110/72 119/77 113/75   BP Location: Right arm Right arm Right arm Right arm   Patient Position: Lying Lying Lying Sitting   Pulse: 74 68 74 83   Resp: 16 18 16 17   Temp: 97.8 °F (36.6 °C) 98.1 °F (36.7 °C) 97.6 °F (36.4 °C) 97.6 °F (36.4 °C)   TempSrc: Oral Oral Oral Oral   SpO2: 98% 98% 98% 98%   Weight:       Height:         Temp (24hrs), Av.8 °F (36.6 °C), Min:97.6 °F (36.4 °C), Max:98.1 °F (36.7 °C)      General Appearance:    Alert, cooperative, in no acute distress   Abdomen:     Soft non-tender, non-distended, no guarding, no rebound         tenderness.   Extremities:   Moves all extremities well, no edema, no cyanosis, no              Redness.   Incision:  N/A   Fundus:   Firm, below umbilicus     Feeding method: Breastfeeding Status: Yes    Labs:  Results from last 7 days   Lab Units 25  0436 25  1852   WBC 10*3/mm3 14.85* 21.16*    HEMOGLOBIN g/dL 10.6* 12.9   HEMATOCRIT % 32.3* 39.6   PLATELETS 10*3/mm3 174 178           Blood Type: RH Positive      Plan:  Discharge to home.    Follow-up appointment with Dr. Benedict in 6 weeks.   Anemia s/p delivery, asymptomatic. Cont PO FeSO4.  All discharge instructions were reviewed with pt including bleeding warnings, s/sx of PP depression, and warning signs in the PP period for which to seek medical attention including but not limited to s/sx of hypertension and thromboembolism.     Miranda Mehta, APRN  7/2/2025  07:52 EDT

## 2025-07-02 NOTE — PLAN OF CARE
Problem: Adult Inpatient Plan of Care  Goal: Plan of Care Review  Outcome: Progressing  Flowsheets (Taken 7/2/2025 0624)  Progress: improving  Outcome Evaluation: Pt resting between feeds, tolerating breast feeds well. Pain managed with medications. Encouraged to PO hydrate. Continued education about latching and positioning. No new concerns at this time.  Plan of Care Reviewed With: patient  Goal: Patient-Specific Goal (Individualized)  Outcome: Progressing  Goal: Absence of Hospital-Acquired Illness or Injury  Outcome: Progressing  Intervention: Identify and Manage Fall Risk  Recent Flowsheet Documentation  Taken 7/2/2025 0612 by Janny Gutierrez RN  Safety Promotion/Fall Prevention: safety round/check completed  Taken 7/2/2025 0355 by Janny Gutierrez RN  Safety Promotion/Fall Prevention: safety round/check completed  Taken 7/2/2025 0320 by Janny Gutierrez RN  Safety Promotion/Fall Prevention: safety round/check completed  Taken 7/2/2025 0030 by Janny Gutierrez RN  Safety Promotion/Fall Prevention: safety round/check completed  Taken 7/1/2025 2257 by Janny Gutierrez RN  Safety Promotion/Fall Prevention: safety round/check completed  Taken 7/1/2025 2024 by Janny Gutierrez RN  Safety Promotion/Fall Prevention:   activity supervised   assistive device/personal items within reach   clutter free environment maintained   safety round/check completed  Intervention: Prevent Infection  Recent Flowsheet Documentation  Taken 7/1/2025 2024 by Janny Gutierrez RN  Infection Prevention:   visitors restricted/screened   single patient room provided   rest/sleep promoted   hand hygiene promoted   cohorting utilized  Goal: Optimal Comfort and Wellbeing  Outcome: Progressing  Intervention: Monitor Pain and Promote Comfort  Recent Flowsheet Documentation  Taken 7/1/2025 2024 by Janny Gutierrez RN  Pain Management Interventions: pain medication given  Intervention: Provide Person-Centered Care  Recent  Flowsheet Documentation  Taken 7/1/2025 2024 by Janny Gutierrez RN  Trust Relationship/Rapport:   care explained   choices provided   thoughts/feelings acknowledged  Goal: Readiness for Transition of Care  Outcome: Progressing     Problem: Postpartum (Vaginal Delivery)  Goal: Successful Parent Role Transition  Outcome: Progressing  Intervention: Support Parent Role Transition  Recent Flowsheet Documentation  Taken 7/1/2025 2024 by Janny Gutierrez RN  Supportive Measures:   active listening utilized   counseling provided   verbalization of feelings encouraged  Goal: Hemostasis  Outcome: Progressing  Goal: Absence of Infection Signs and Symptoms  Outcome: Progressing  Goal: Anesthesia/Sedation Recovery  Outcome: Progressing  Intervention: Optimize Anesthesia Recovery  Recent Flowsheet Documentation  Taken 7/2/2025 0612 by Janny Gutierrez RN  Safety Promotion/Fall Prevention: safety round/check completed  Taken 7/2/2025 0355 by Janny Gutierrez RN  Safety Promotion/Fall Prevention: safety round/check completed  Taken 7/2/2025 0320 by Janny Gutierrez RN  Safety Promotion/Fall Prevention: safety round/check completed  Taken 7/2/2025 0030 by Janny Gutierrez RN  Safety Promotion/Fall Prevention: safety round/check completed  Taken 7/1/2025 2257 by Janny Gutierrez RN  Safety Promotion/Fall Prevention: safety round/check completed  Taken 7/1/2025 2024 by Janny Gutierrez RN  Safety Promotion/Fall Prevention:   activity supervised   assistive device/personal items within reach   clutter free environment maintained   safety round/check completed  Goal: Optimal Pain Control and Function  Outcome: Progressing  Intervention: Prevent or Manage Pain  Recent Flowsheet Documentation  Taken 7/1/2025 2024 by Janny Gutierrez RN  Pain Management Interventions: pain medication given  Goal: Effective Urinary Elimination  Outcome: Progressing     Problem: Violence Risk or Actual  Goal: Anger and Impulse  Control  Outcome: Progressing  Intervention: Minimize Safety Risk  Recent Flowsheet Documentation  Taken 7/1/2025 2024 by Janny Gutierrez RN  Enhanced Safety Measures: room near unit station  Intervention: Promote Self-Control  Recent Flowsheet Documentation  Taken 7/1/2025 2024 by Janny Gutierrez RN  Supportive Measures:   active listening utilized   counseling provided   verbalization of feelings encouraged  Environmental Support: calm environment promoted   Goal Outcome Evaluation:  Plan of Care Reviewed With: patient        Progress: improving  Outcome Evaluation: Pt resting between feeds, tolerating breast feeds well. Pain managed with medications. Encouraged to PO hydrate. Continued education about latching and positioning. No new concerns at this time.

## 2025-07-03 ENCOUNTER — MATERNAL SCREENING (OUTPATIENT)
Dept: CALL CENTER | Facility: HOSPITAL | Age: 25
End: 2025-07-03
Payer: MEDICAID

## 2025-07-03 NOTE — OUTREACH NOTE
Maternal Screening Survey      Flowsheet Row Responses   Eligibility Eligible   Prep survey completed? Yes   Facility patient discharged from? [facility]

## 2025-07-14 ENCOUNTER — MATERNAL SCREENING (OUTPATIENT)
Dept: CALL CENTER | Facility: HOSPITAL | Age: 25
End: 2025-07-14
Payer: MEDICAID

## 2025-08-19 ENCOUNTER — HOSPITAL ENCOUNTER (EMERGENCY)
Facility: HOSPITAL | Age: 25
Discharge: HOME | End: 2025-08-19
Attending: EMERGENCY MEDICINE | Admitting: EMERGENCY MEDICINE
Payer: MEDICAID

## 2025-08-19 ENCOUNTER — APPOINTMENT (OUTPATIENT)
Dept: GENERAL RADIOLOGY | Facility: HOSPITAL | Age: 25
End: 2025-08-19
Payer: MEDICAID